# Patient Record
Sex: FEMALE | Race: OTHER | Employment: UNEMPLOYED | URBAN - METROPOLITAN AREA
[De-identification: names, ages, dates, MRNs, and addresses within clinical notes are randomized per-mention and may not be internally consistent; named-entity substitution may affect disease eponyms.]

---

## 2024-08-03 ENCOUNTER — APPOINTMENT (OUTPATIENT)
Dept: GENERAL RADIOLOGY | Age: 89
End: 2024-08-03

## 2024-08-03 ENCOUNTER — APPOINTMENT (OUTPATIENT)
Dept: CT IMAGING | Age: 89
End: 2024-08-03

## 2024-08-03 ENCOUNTER — HOSPITAL ENCOUNTER (EMERGENCY)
Age: 89
Discharge: HOME | End: 2024-08-03
Attending: EMERGENCY MEDICINE

## 2024-08-03 VITALS
DIASTOLIC BLOOD PRESSURE: 84 MMHG | SYSTOLIC BLOOD PRESSURE: 112 MMHG | HEIGHT: 65 IN | RESPIRATION RATE: 18 BRPM | TEMPERATURE: 98.7 F | HEART RATE: 64 BPM | OXYGEN SATURATION: 100 %

## 2024-08-03 DIAGNOSIS — M19.011 ARTHRITIS OF RIGHT SHOULDER REGION: Primary | ICD-10-CM

## 2024-08-03 LAB
ALBUMIN SERPL-MCNC: 3.8 G/DL (ref 3.5–4.6)
ALP SERPL-CCNC: 81 U/L (ref 40–130)
ALT SERPL-CCNC: 12 U/L (ref 0–33)
ANION GAP SERPL CALCULATED.3IONS-SCNC: 7 MEQ/L (ref 9–15)
AST SERPL-CCNC: 14 U/L (ref 0–35)
BASOPHILS # BLD: 0 K/UL (ref 0–0.2)
BASOPHILS NFR BLD: 0.5 %
BILIRUB SERPL-MCNC: 0.5 MG/DL (ref 0.2–0.7)
BUN SERPL-MCNC: 12 MG/DL (ref 8–23)
CALCIUM SERPL-MCNC: 9.7 MG/DL (ref 8.5–9.9)
CHLORIDE SERPL-SCNC: 100 MEQ/L (ref 95–107)
CO2 SERPL-SCNC: 30 MEQ/L (ref 20–31)
CREAT SERPL-MCNC: 0.64 MG/DL (ref 0.5–0.9)
D DIMER PPP FEU-MCNC: 0.75 MG/L FEU (ref 0–0.5)
EKG ATRIAL RATE: 45 BPM
EKG Q-T INTERVAL: 412 MS
EKG QRS DURATION: 92 MS
EKG QTC CALCULATION (BAZETT): 397 MS
EKG R AXIS: -7 DEGREES
EKG T AXIS: 13 DEGREES
EKG VENTRICULAR RATE: 56 BPM
EOSINOPHIL # BLD: 0.2 K/UL (ref 0–0.7)
EOSINOPHIL NFR BLD: 3.3 %
ERYTHROCYTE [DISTWIDTH] IN BLOOD BY AUTOMATED COUNT: 16.8 % (ref 11.5–14.5)
GLOBULIN SER CALC-MCNC: 2.7 G/DL (ref 2.3–3.5)
GLUCOSE SERPL-MCNC: 115 MG/DL (ref 70–99)
HCT VFR BLD AUTO: 36.3 % (ref 37–47)
HGB BLD-MCNC: 10.9 G/DL (ref 12–16)
LYMPHOCYTES # BLD: 1.5 K/UL (ref 1–4.8)
LYMPHOCYTES NFR BLD: 20.1 %
MCH RBC QN AUTO: 25.8 PG (ref 27–31.3)
MCHC RBC AUTO-ENTMCNC: 30 % (ref 33–37)
MCV RBC AUTO: 85.8 FL (ref 79.4–94.8)
MONOCYTES # BLD: 1 K/UL (ref 0.2–0.8)
MONOCYTES NFR BLD: 13 %
NEUTROPHILS # BLD: 4.6 K/UL (ref 1.4–6.5)
NEUTS SEG NFR BLD: 63 %
PLATELET # BLD AUTO: 280 K/UL (ref 130–400)
POTASSIUM SERPL-SCNC: 4.1 MEQ/L (ref 3.4–4.9)
PROT SERPL-MCNC: 6.5 G/DL (ref 6.3–8)
RBC # BLD AUTO: 4.23 M/UL (ref 4.2–5.4)
SODIUM SERPL-SCNC: 137 MEQ/L (ref 135–144)
TROPONIN, HIGH SENSITIVITY: 16 NG/L (ref 0–19)
TROPONIN, HIGH SENSITIVITY: 17 NG/L (ref 0–19)
WBC # BLD AUTO: 7.3 K/UL (ref 4.8–10.8)

## 2024-08-03 PROCEDURE — 71045 X-RAY EXAM CHEST 1 VIEW: CPT

## 2024-08-03 PROCEDURE — 84484 ASSAY OF TROPONIN QUANT: CPT

## 2024-08-03 PROCEDURE — 99285 EMERGENCY DEPT VISIT HI MDM: CPT

## 2024-08-03 PROCEDURE — 85379 FIBRIN DEGRADATION QUANT: CPT

## 2024-08-03 PROCEDURE — 71250 CT THORAX DX C-: CPT

## 2024-08-03 PROCEDURE — 96372 THER/PROPH/DIAG INJ SC/IM: CPT

## 2024-08-03 PROCEDURE — 6360000002 HC RX W HCPCS: Performed by: PHYSICIAN ASSISTANT

## 2024-08-03 PROCEDURE — 73030 X-RAY EXAM OF SHOULDER: CPT

## 2024-08-03 PROCEDURE — 80053 COMPREHEN METABOLIC PANEL: CPT

## 2024-08-03 PROCEDURE — 85025 COMPLETE CBC W/AUTO DIFF WBC: CPT

## 2024-08-03 PROCEDURE — 36415 COLL VENOUS BLD VENIPUNCTURE: CPT

## 2024-08-03 RX ORDER — KETOROLAC TROMETHAMINE 30 MG/ML
30 INJECTION, SOLUTION INTRAMUSCULAR; INTRAVENOUS ONCE
Status: COMPLETED | OUTPATIENT
Start: 2024-08-03 | End: 2024-08-03

## 2024-08-03 RX ADMIN — KETOROLAC TROMETHAMINE 30 MG: 30 INJECTION, SOLUTION INTRAMUSCULAR at 16:19

## 2024-08-03 ASSESSMENT — PAIN - FUNCTIONAL ASSESSMENT: PAIN_FUNCTIONAL_ASSESSMENT: 0-10

## 2024-08-03 ASSESSMENT — ENCOUNTER SYMPTOMS
ABDOMINAL PAIN: 0
ABDOMINAL DISTENTION: 0
EYE DISCHARGE: 0
RHINORRHEA: 0
COLOR CHANGE: 0
SORE THROAT: 0
BACK PAIN: 0
SHORTNESS OF BREATH: 0
CONSTIPATION: 0

## 2024-08-03 ASSESSMENT — PAIN DESCRIPTION - LOCATION
LOCATION: SHOULDER
LOCATION: SHOULDER

## 2024-08-03 ASSESSMENT — LIFESTYLE VARIABLES
HOW MANY STANDARD DRINKS CONTAINING ALCOHOL DO YOU HAVE ON A TYPICAL DAY: PATIENT DOES NOT DRINK
HOW OFTEN DO YOU HAVE A DRINK CONTAINING ALCOHOL: NEVER

## 2024-08-03 ASSESSMENT — PAIN DESCRIPTION - DESCRIPTORS
DESCRIPTORS: ACHING
DESCRIPTORS: ACHING

## 2024-08-03 ASSESSMENT — PAIN DESCRIPTION - PAIN TYPE: TYPE: CHRONIC PAIN

## 2024-08-03 ASSESSMENT — PAIN DESCRIPTION - ORIENTATION
ORIENTATION: RIGHT
ORIENTATION: RIGHT

## 2024-08-03 ASSESSMENT — PAIN SCALES - GENERAL
PAINLEVEL_OUTOF10: 8
PAINLEVEL_OUTOF10: 5

## 2024-08-03 NOTE — ED TRIAGE NOTES
Pt sent in by EMS with c/o right shoulder pain for a few weeks. Pt denies any injury. Pt denies fall. Pt is a/o x 4 anxious. No deformity noted to right shoulder. No acute distress noted

## 2024-08-03 NOTE — ED PROVIDER NOTES
[facility] ED  EMERGENCY DEPARTMENT ENCOUNTER      Pt Name: Paige Dominguez  MRN: [id number]  Birthdate 1935  Date of evaluation: 8/3/2024  Provider: ALEJANDRO Costello  4:04 PM EDT    CHIEF COMPLAINT       Chief Complaint   Patient presents with    Shoulder Pain     Pt c/o right shoulder pain for weeks, denies injury         HISTORY OF PRESENT ILLNESS   (Location/Symptom, Timing/Onset, Context/Setting, Quality, Duration, Modifying Factors, Severity)  Note limiting factors.   Paige Dominguez is a 88 y.o. female who presents to the emergency department with complaint of right shoulder pain, patient arrived by EMS, per EMS states that patient family had stated pain started 2 days ago, when I discussed this with the patient, she states it started this morning for her, she denies any acute injury, no head neck or back pain otherwise, there is no numbness or tingling, no chest pain or shortness of breath, no cough or fevers, she is rating her current pain as a 5 out of 10 at this time, she has not take anything at home for pain control.    HPI    Nursing Notes were reviewed.    REVIEW OF SYSTEMS    (2-9 systems for level 4, 10 or more for level 5)     Review of Systems   Constitutional:  Negative for activity change and appetite change.   HENT:  Negative for congestion, ear discharge, ear pain, nosebleeds, rhinorrhea and sore throat.    Eyes:  Negative for discharge.   Respiratory:  Negative for shortness of breath.    Cardiovascular:  Negative for chest pain, palpitations and leg swelling.   Gastrointestinal:  Negative for abdominal distention, abdominal pain and constipation.   Genitourinary:  Negative for difficulty urinating and dysuria.   Musculoskeletal:  Negative for arthralgias, back pain, neck pain and neck stiffness.        Right shoulder pain   Skin:  Negative for color change.   Neurological:  Negative for dizziness, tremors, syncope, weakness, numbness and headaches.   Psychiatric/Behavioral:           DISCHARGE MEDICATIONS:  New Prescriptions    No medications on file     Controlled Substances Monitoring:          No data to display                (Please note that portions of this note were completed with a voice recognition program.  Efforts were made to edit the dictations but occasionally words are mis-transcribed.)    ALEJANDRO Costello (electronically signed)  Attending Emergency Physician    Supervising Attending Physician: Dr. Almonte.      Hannah Goldstein PA  08/03/24 [id number]

## 2024-08-07 LAB
EKG ATRIAL RATE: 45 BPM
EKG Q-T INTERVAL: 412 MS
EKG QRS DURATION: 92 MS
EKG QTC CALCULATION (BAZETT): 397 MS
EKG R AXIS: -7 DEGREES
EKG T AXIS: 13 DEGREES
EKG VENTRICULAR RATE: 56 BPM

## 2024-08-25 ENCOUNTER — HOSPITAL ENCOUNTER (EMERGENCY)
Age: 89
Discharge: HOME | End: 2024-08-25
Attending: EMERGENCY MEDICINE
Payer: MEDICARE

## 2024-08-25 ENCOUNTER — APPOINTMENT (OUTPATIENT)
Dept: GENERAL RADIOLOGY | Age: 89
End: 2024-08-25
Payer: MEDICARE

## 2024-08-25 VITALS
BODY MASS INDEX: 29.95 KG/M2 | WEIGHT: 180 LBS | TEMPERATURE: 98.1 F | SYSTOLIC BLOOD PRESSURE: 132 MMHG | DIASTOLIC BLOOD PRESSURE: 63 MMHG | OXYGEN SATURATION: 100 % | HEART RATE: 64 BPM | RESPIRATION RATE: 20 BRPM

## 2024-08-25 DIAGNOSIS — F03.B0 MODERATE DEMENTIA, UNSPECIFIED DEMENTIA TYPE, UNSPECIFIED WHETHER BEHAVIORAL, PSYCHOTIC, OR MOOD DISTURBANCE OR ANXIETY (HCC): ICD-10-CM

## 2024-08-25 DIAGNOSIS — Z13.9 ENCOUNTER FOR MEDICAL SCREENING EXAMINATION: Primary | ICD-10-CM

## 2024-08-25 LAB
ALBUMIN SERPL-MCNC: 3.8 G/DL (ref 3.5–4.6)
ALP SERPL-CCNC: 81 U/L (ref 40–130)
ALT SERPL-CCNC: 15 U/L (ref 0–33)
ANION GAP SERPL CALCULATED.3IONS-SCNC: 7 MEQ/L (ref 9–15)
AST SERPL-CCNC: 16 U/L (ref 0–35)
B PARAP IS1001 DNA NPH QL NAA+NON-PROBE: NOT DETECTED
B PERT.PT PRMT NPH QL NAA+NON-PROBE: NOT DETECTED
BACTERIA URNS QL MICRO: NEGATIVE /HPF
BASOPHILS # BLD: 0 K/UL (ref 0–0.2)
BASOPHILS NFR BLD: 0.5 %
BILIRUB SERPL-MCNC: 0.5 MG/DL (ref 0.2–0.7)
BILIRUB UR QL STRIP: NEGATIVE
BUN SERPL-MCNC: 12 MG/DL (ref 8–23)
C PNEUM DNA NPH QL NAA+NON-PROBE: NOT DETECTED
CALCIUM SERPL-MCNC: 9.4 MG/DL (ref 8.5–9.9)
CHLORIDE SERPL-SCNC: 104 MEQ/L (ref 95–107)
CLARITY UR: CLEAR
CO2 SERPL-SCNC: 30 MEQ/L (ref 20–31)
COLOR UR: YELLOW
CREAT SERPL-MCNC: 0.59 MG/DL (ref 0.5–0.9)
EOSINOPHIL # BLD: 0.2 K/UL (ref 0–0.7)
EOSINOPHIL NFR BLD: 2.5 %
EPI CELLS #/AREA URNS AUTO: NORMAL /HPF (ref 0–5)
ERYTHROCYTE [DISTWIDTH] IN BLOOD BY AUTOMATED COUNT: 17.3 % (ref 11.5–14.5)
FLUAV RNA NPH QL NAA+NON-PROBE: NOT DETECTED
FLUBV RNA NPH QL NAA+NON-PROBE: NOT DETECTED
GLOBULIN SER CALC-MCNC: 2.7 G/DL (ref 2.3–3.5)
GLUCOSE SERPL-MCNC: 216 MG/DL (ref 70–99)
GLUCOSE UR STRIP-MCNC: NEGATIVE MG/DL
HADV DNA NPH QL NAA+NON-PROBE: NOT DETECTED
HCOV 229E RNA NPH QL NAA+NON-PROBE: NOT DETECTED
HCOV HKU1 RNA NPH QL NAA+NON-PROBE: NOT DETECTED
HCOV NL63 RNA NPH QL NAA+NON-PROBE: NOT DETECTED
HCOV OC43 RNA NPH QL NAA+NON-PROBE: NOT DETECTED
HCT VFR BLD AUTO: 36.4 % (ref 37–47)
HGB BLD-MCNC: 11.2 G/DL (ref 12–16)
HGB UR QL STRIP: NEGATIVE
HMPV RNA NPH QL NAA+NON-PROBE: NOT DETECTED
HPIV1 RNA NPH QL NAA+NON-PROBE: NOT DETECTED
HPIV2 RNA NPH QL NAA+NON-PROBE: NOT DETECTED
HPIV3 RNA NPH QL NAA+NON-PROBE: NOT DETECTED
HPIV4 RNA NPH QL NAA+NON-PROBE: NOT DETECTED
HYALINE CASTS #/AREA URNS AUTO: NORMAL /HPF (ref 0–5)
KETONES UR STRIP-MCNC: NEGATIVE MG/DL
LACTATE BLDV-SCNC: 1.7 MMOL/L (ref 0.5–2.2)
LEUKOCYTE ESTERASE UR QL STRIP: NEGATIVE
LYMPHOCYTES # BLD: 1.4 K/UL (ref 1–4.8)
LYMPHOCYTES NFR BLD: 23 %
M PNEUMO DNA NPH QL NAA+NON-PROBE: NOT DETECTED
MCH RBC QN AUTO: 25.8 PG (ref 27–31.3)
MCHC RBC AUTO-ENTMCNC: 30.8 % (ref 33–37)
MCV RBC AUTO: 83.9 FL (ref 79.4–94.8)
MONOCYTES # BLD: 0.9 K/UL (ref 0.2–0.8)
MONOCYTES NFR BLD: 14.6 %
NEUTROPHILS # BLD: 3.6 K/UL (ref 1.4–6.5)
NEUTS SEG NFR BLD: 59.2 %
NITRITE UR QL STRIP: NEGATIVE
PH UR STRIP: 6.5 [PH] (ref 5–9)
PLATELET # BLD AUTO: 324 K/UL (ref 130–400)
POTASSIUM SERPL-SCNC: 4.2 MEQ/L (ref 3.4–4.9)
PROT SERPL-MCNC: 6.5 G/DL (ref 6.3–8)
PROT UR STRIP-MCNC: 100 MG/DL
RBC # BLD AUTO: 4.34 M/UL (ref 4.2–5.4)
RBC #/AREA URNS AUTO: NORMAL /HPF (ref 0–5)
RSV RNA NPH QL NAA+NON-PROBE: NOT DETECTED
RV+EV RNA NPH QL NAA+NON-PROBE: NOT DETECTED
SARS-COV-2 RNA NPH QL NAA+NON-PROBE: NOT DETECTED
SODIUM SERPL-SCNC: 141 MEQ/L (ref 135–144)
SP GR UR STRIP: 1.01 (ref 1–1.03)
URINE REFLEX TO CULTURE: ABNORMAL
UROBILINOGEN UR STRIP-ACNC: 1 E.U./DL
WBC # BLD AUTO: 6.1 K/UL (ref 4.8–10.8)
WBC #/AREA URNS AUTO: NORMAL /HPF (ref 0–5)

## 2024-08-25 PROCEDURE — 85025 COMPLETE CBC W/AUTO DIFF WBC: CPT

## 2024-08-25 PROCEDURE — 81001 URINALYSIS AUTO W/SCOPE: CPT

## 2024-08-25 PROCEDURE — 99284 EMERGENCY DEPT VISIT MOD MDM: CPT

## 2024-08-25 PROCEDURE — 83605 ASSAY OF LACTIC ACID: CPT

## 2024-08-25 PROCEDURE — 6370000000 HC RX 637 (ALT 250 FOR IP): Performed by: EMERGENCY MEDICINE

## 2024-08-25 PROCEDURE — 71045 X-RAY EXAM CHEST 1 VIEW: CPT

## 2024-08-25 PROCEDURE — 0202U NFCT DS 22 TRGT SARS-COV-2: CPT

## 2024-08-25 PROCEDURE — 94640 AIRWAY INHALATION TREATMENT: CPT

## 2024-08-25 PROCEDURE — 94761 N-INVAS EAR/PLS OXIMETRY MLT: CPT

## 2024-08-25 PROCEDURE — 80053 COMPREHEN METABOLIC PANEL: CPT

## 2024-08-25 RX ORDER — IPRATROPIUM BROMIDE AND ALBUTEROL SULFATE 2.5; .5 MG/3ML; MG/3ML
1 SOLUTION RESPIRATORY (INHALATION) CONTINUOUS PRN
Status: DISCONTINUED | OUTPATIENT
Start: 2024-08-25 | End: 2024-08-26 | Stop reason: HOSPADM

## 2024-08-25 RX ADMIN — IPRATROPIUM BROMIDE AND ALBUTEROL SULFATE 1 DOSE: .5; 2.5 SOLUTION RESPIRATORY (INHALATION) at 20:06

## 2024-08-25 ASSESSMENT — PAIN - FUNCTIONAL ASSESSMENT: PAIN_FUNCTIONAL_ASSESSMENT: 0-10

## 2024-08-25 ASSESSMENT — PAIN SCALES - GENERAL: PAINLEVEL_OUTOF10: 0

## 2024-08-25 NOTE — ED TRIAGE NOTES
Pt here for black tarry stools starting yesterday. Per EMS, patient family states pt is more confused. Patient is A&Ox2 at baseline. Pt is A&Ox2 on arrival. Pt on eliquis. Pt VSS, but has some wheezing. Patient denies pain and SOB.

## 2024-08-26 NOTE — ED NOTES
Provider notified that patient had pepto x 1 last night, but was already having bloody stools prior. Pt reports 7 episodes of tarry stool.

## 2024-08-26 NOTE — ED PROVIDER NOTES
[facility] ED  eMERGENCY dEPARTMENT eNCOUnter      Pt Name: Paige Dominguez  MRN: [id number]  Birthdate 1935  Date of evaluation: 8/25/2024  Provider: Bob Shaw MD    CHIEF COMPLAINT       Chief Complaint   Patient presents with    Melena     Black tarry stools since yesterday. Pt on eliquis.     Altered Mental Status     Per EMS, pt is more confused than baseline of A&Ox 2.          HISTORY OF PRESENT ILLNESS   (Location/Symptom, Timing/Onset,Context/Setting, Quality, Duration, Modifying Factors, Severity)  Note limiting factors.   Paige Dominguez is a 88 y.o. female who presents to the emergency department with complaints of black stool and altered mental status.  Patient has history of dementia and is wheelchair bound lives at home with family.  They noted black stool last couple days but she also received Pepto-Bismol at home.  They felt that today she was less alert than normal.  Called EMS to have her brought in for evaluation.  The patient is awake alert and oriented x 2 which is her baseline.  She appears to have some increased work of breathing and audible wheezes on presentation.  She has no complaints of pain.  No fever.  No cough.    HPI    NursingNotes were reviewed.    REVIEW OF SYSTEMS    (2-9 systems for level 4, 10 or more for level 5)     Review of Systems   Unable to perform ROS: Dementia       Except as noted above the remainder of the review of systems was reviewed and negative.       PAST MEDICAL HISTORY   No past medical history on file.      SURGICALHISTORY     No past surgical history on file.      CURRENT MEDICATIONS       Previous Medications    No medications on file       ALLERGIES     Penicillins    FAMILY HISTORY     No family history on file.       SOCIAL HISTORY       Social History     Socioeconomic History    Marital status: Single     Social Determinants of Health     Financial Resource Strain: Patient Declined (6/5/2023)    Received from [facility], 
[phone]

## 2024-08-26 NOTE — ED NOTES
Rectal exam performed by Dr. Shaw with this nurse present. Patient occult stool negative. Control WNL.

## 2024-11-21 ENCOUNTER — HOSPITAL ENCOUNTER (INPATIENT)
Age: 89
LOS: 3 days | Discharge: HOME HEALTH | DRG: 884 | End: 2024-11-24
Attending: FAMILY MEDICINE | Admitting: INTERNAL MEDICINE
Payer: MEDICARE

## 2024-11-21 ENCOUNTER — APPOINTMENT (OUTPATIENT)
Dept: CT IMAGING | Age: 89
DRG: 884 | End: 2024-11-21
Payer: MEDICARE

## 2024-11-21 ENCOUNTER — APPOINTMENT (OUTPATIENT)
Dept: GENERAL RADIOLOGY | Age: 89
DRG: 884 | End: 2024-11-21
Payer: MEDICARE

## 2024-11-21 PROBLEM — G93.40 ENCEPHALOPATHY ACUTE: Status: ACTIVE | Noted: 2024-11-21

## 2024-11-21 ASSESSMENT — PAIN - FUNCTIONAL ASSESSMENT: PAIN_FUNCTIONAL_ASSESSMENT: NONE - DENIES PAIN

## 2024-11-21 NOTE — PLAN OF CARE
Problem: Discharge Planning  Goal: Discharge to home or other facility with appropriate resources  Outcome: Progressing     Problem: Skin/Tissue Integrity  Goal: Absence of new skin breakdown  Description: 1.  Monitor for areas of redness and/or skin breakdown  2.  Assess vascular access sites hourly  3.  Every 4-6 hours minimum:  Change oxygen saturation probe site  4.  Every 4-6 hours:  If on nasal continuous positive airway pressure, respiratory therapy assess nares and determine need for appliance change or resting period.  Outcome: Progressing     Problem: Confusion  Goal: Confusion, delirium, dementia, or psychosis is improved or at baseline  Description: INTERVENTIONS:  1. Assess for possible contributors to thought disturbance, including medications, impaired vision or hearing, underlying metabolic abnormalities, dehydration, psychiatric diagnoses, and notify attending LIP  2. [place] high risk fall precautions, as indicated  3. Provide frequent short contacts to provide reality reorientation, refocusing and direction  4. Decrease environmental stimuli, including noise as appropriate  5. Monitor and intervene to maintain adequate nutrition, hydration, elimination, sleep and activity  6. If unable to ensure safety without constant attention obtain sitter and review sitter guidelines with assigned personnel  7. Initiate Psychosocial CNS and Spiritual Care consult, as indicated  Outcome: Progressing     Problem: Safety - Adult  Goal: Free from fall injury  Outcome: Progressing

## 2024-11-21 NOTE — H&P
Hospital Medicine  History and Physical    Patient:  Paige Dominguez  MRN: [id number]    CHIEF COMPLAINT:    Chief Complaint   Patient presents with    Dementia       History Obtained From:  Patient, EMR  Primary Care Physician: Unknown, Provider, MD    HISTORY OF PRESENT ILLNESS:   The patient is a 89 y.o. female with PMH of HTN, AF, DM2, CKD3, asthma, GERD, depression,right hip fracture s/p repair, RUL NSCLC s/p RT, dementia who presented with the above CC. Patient has been experiencing worsening of her mental status at home including hallucinations and smearing poop around the house per report. She was hypertensive on arrival with SBP in the 180-190s, initial w/u showed possible RUL pneumonia, elevated lactate and hyperglycemia, IVFs and Rocephin/Zithromax given, admitted for further management.     Past Medical History:  HTN, AF, DM2, CKD3, asthma, GERD, depression,right hip fracture s/p repair, RUL NSCLC s/p RT, dementia     Past Surgical History:  No past surgical history on file.    Medications Prior to Admission:    Prior to Admission medications    Not on File       Allergies:  Penicillins    Social History:   TOBACCO:   has no history on file for tobacco use.  ETOH:   has no history on file for alcohol use.      Family History:   No family history on file.    REVIEW OF SYSTEMS:  Ten systems reviewed and negative except for stated in HPI    Physical Exam:    Vitals: BP (!) 193/79   Pulse 78   Temp 98.9 °F (37.2 °C) (Oral)   Resp 26   Ht 1.651 m (5' 5\")   Wt 79.4 kg (175 lb)   SpO2 97%   BMI 29.12 kg/m²   General appearance: awake, cooperative  HEENT: Head: Normocephalic, no lesions, without obvious abnormality.  Neck: supple, symmetrical, trachea midline  Lungs: clear to auscultation bilaterally  Heart: S1/S2, irregular  Abdomen: soft, active BS  Extremities:  no edema  Neurologic: Mental status: AxOx1(self), follows commands     Recent Labs     11/21/24  1442   WBC 5.7   HGB 12.9        Recent Labs

## 2024-11-21 NOTE — ED TRIAGE NOTES
[facility] reports pt was showing bazarre behavior, hallucinating, smearing poop in places. Pt has hx of dementia but appears to be getting worse. Pt on arrival able follow commands denies pain, oriented to self

## 2024-11-21 NOTE — ED PROVIDER NOTES
[facility] ED  EMERGENCY DEPARTMENT ENCOUNTER      Pt Name: Paige Dominguez  MRN: [id number]  Birthdate 1935  Date of evaluation: 11/21/2024  Provider: Graeme Babin PA-C  2:18 PM EST      CHIEF COMPLAINT       Chief Complaint   Patient presents with    Dementia         HISTORY OF PRESENT ILLNESS   (Location/Symptom, Timing/Onset, Context/Setting, Quality, Duration, Modifying Factors, Severity)  Note limiting factors.   Paige Dominguez is a 89 y.o. female who presents to the emergency department with PMHx dementia who presents to the emergency department with family states rapid decline in patient's mentation since yesterday, with sleep disturbances, delusions, hallucinations and rapid decline in functioning.  Patient is alert to self only and unable to provide insight into the nature of her illness.    HPI    Nursing Notes were reviewed.    REVIEW OF SYSTEMS    (2-9 systems for level 4, 10 or more for level 5)     Review of Systems   Unable to perform ROS: Dementia       Except as noted above the remainder of the review of systems was reviewed and negative.       PAST MEDICAL HISTORY   No past medical history on file.      SURGICAL HISTORY     No past surgical history on file.      CURRENT MEDICATIONS       Previous Medications    No medications on file       ALLERGIES     Penicillins    FAMILY HISTORY     No family history on file.       SOCIAL HISTORY       Social History     Socioeconomic History    Marital status: Single     Social Determinants of Health     Financial Resource Strain: Patient Declined (6/5/2023)    Received from [facility]    Overall Financial Resource Strain (CARDIA)     Difficulty of Paying Living Expenses: Patient declined   Food Insecurity: Patient Declined (6/5/2023)    Received from [facility]    Hunger Vital Sign     Worried About Running Out of Food in the Last Year: Patient declined     Ran Out of Food in the Last Year: Patient

## 2024-11-22 ENCOUNTER — APPOINTMENT (OUTPATIENT)
Age: 89
DRG: 884 | End: 2024-11-22
Attending: INTERNAL MEDICINE
Payer: MEDICARE

## 2024-11-22 PROBLEM — J18.9 PNEUMONIA OF RIGHT UPPER LOBE DUE TO INFECTIOUS ORGANISM: Status: ACTIVE | Noted: 2024-11-22

## 2024-11-22 NOTE — CONSULTS
Inpatient consult to Cardiology  Consult performed by: Sravan Marino MD  Consult ordered by: Angelita Garner APRN - CNP          Patient Name: Paige Dominguez  Admit Date: 2024  1:54 PM  MR #: [id number]  : 1935    Attending Physician: Jorge Anguiano MD  Reason for consult: Bradycardia    History of Presenting Illness:      Paige Dominguez is a 89 y.o. female on hospital day 1 with a history of hypertension, hyperlipidemia, diabetes, CKD dementia admitted to the hospital for altered mental status. History Obtained From:  patient, electronic medical record      EKG atrial fibrillation rate 73 bpm nonspecific ST changes high lateral leads  On telemetry patient noted with sinus pauses of 2.6 seconds  Electrolytes grossly unremarkable    History:      History reviewed. No pertinent past medical history.  No past surgical history on file.  Family History  No family history on file.  [] Unable to obtain due to ventilated and/ or neurologic status  Social History     Socioeconomic History    Marital status: Single     Spouse name: Not on file    Number of children: Not on file    Years of education: Not on file    Highest education level: Not on file   Occupational History    Not on file   Tobacco Use    Smoking status: Never    Smokeless tobacco: Never   Vaping Use    Vaping status: Never Used   Substance and Sexual Activity    Alcohol use: Not on file    Drug use: Not on file    Sexual activity: Not on file   Other Topics Concern    Not on file   Social History Narrative    Not on file     Social Determinants of Health     Financial Resource Strain: Patient Declined (2023)    Received from [facility], [facility]    Overall Financial Resource Strain (CARDIA)     Difficulty of Paying Living Expenses: Patient declined   Food Insecurity: No Food Insecurity (2024)    Hunger Vital Sign     Worried About Running Out of Food in the Last Year: Never true     Ran Out of Food in the Last Year: Never 
American Board of Psychiatry & Neurology  Board Certified in Vascular Neurology  Board Certified in Neuromuscular Medicine  Certified in Neurorehabilitation           Collaborating physicians: Dr Baumann    Electronically signed by YANETH Zarco CNP on 11/22/2024 at 10:40 AM

## 2024-11-22 NOTE — CARE COORDINATION
SPOKE W/SON SENATE TO ASSESS NEEDS AND DISCUSS DISCHARGE PLAN WHICH IS HOME W/FAMILY WHO ARE SUPPORTIVE.  THEY DECLINE SNF/REHAB. THEY ARE AGREEABLE TO [facility] IF INDICATED. THEY WILL CALL CM BACK WITH THE NAME OF THE [facility] AGENCY TO SEND REFERRAL. CURRENTLY PT IS ON THE PHONE. BILLS IN ROOM FOR SAFETY. REVIEWED PG 1 IMM AND WILL PROVIDE COPY TO PT. SON ACKNOWLEDGES UNDERSTANDING. NO QUESTIONS AT THIS TIME.

## 2024-11-22 NOTE — CARE COORDINATION
Met with patient. Patient states she lives at home and her family helps take care of her. Takes to appointments and gets medications. Patient denies any concerns with getting her medications. Patient provided Pneumonia Education Booklet and Zone as she prefers [place] literature. English copy provided for daughter in law that speaks English as she is her care giver.   Definition of pneumonia discussed.  Causes of different types of pneumonia reviewed.  Symptoms discussed and pt does understand that they may have some or all of these symptoms.  Testing to diagnose pneumonia reviewed as well as possible treatments.  Importance of avoiding infections discussed including: taking medication as directed, washing hands, disposing of used tissues, getting the pneumonia and flu vaccines, and avoiding others who are ill.  Importance of not smoking and to avoid others who may be smoking around patient stressed.  Pneumonia Zones also reviewed. \"Green\" zone is the goal, \"Yellow\" zone means to call the doctor, and \"Red\" zone means to call the doctor ASAP or call 911.  Copies of Pneumonia booklet and Zone Pamphlet given to patient.  Offer for patient to express any concerns or questions. Pt denies having further questions at this time.

## 2024-11-22 NOTE — PLAN OF CARE
Problem: Discharge Planning  Goal: Discharge to home or other facility with appropriate resources  11/22/2024 0842 by Yaima Saucedo RN  Outcome: Progressing  11/22/2024 0223 by Mari Arroyo RN  Outcome: Progressing     Problem: Skin/Tissue Integrity  Goal: Absence of new skin breakdown  Description: 1.  Monitor for areas of redness and/or skin breakdown  2.  Assess vascular access sites hourly  3.  Every 4-6 hours minimum:  Change oxygen saturation probe site  4.  Every 4-6 hours:  If on nasal continuous positive airway pressure, respiratory therapy assess nares and determine need for appliance change or resting period.  11/22/2024 0842 by Yaima Saucedo RN  Outcome: Progressing  11/22/2024 0223 by Mari Arroyo RN  Outcome: Progressing     Problem: Confusion  Goal: Confusion, delirium, dementia, or psychosis is improved or at baseline  Description: INTERVENTIONS:  1. Assess for possible contributors to thought disturbance, including medications, impaired vision or hearing, underlying metabolic abnormalities, dehydration, psychiatric diagnoses, and notify attending LIP  2. [place] high risk fall precautions, as indicated  3. Provide frequent short contacts to provide reality reorientation, refocusing and direction  4. Decrease environmental stimuli, including noise as appropriate  5. Monitor and intervene to maintain adequate nutrition, hydration, elimination, sleep and activity  6. If unable to ensure safety without constant attention obtain sitter and review sitter guidelines with assigned personnel  7. Initiate Psychosocial CNS and Spiritual Care consult, as indicated  11/22/2024 0842 by Yaima Saucedo RN  Outcome: Progressing  11/22/2024 0223 by Mari Arroyo RN  Outcome: Progressing     Problem: Safety - Adult  Goal: Free from fall injury  11/22/2024 0842 by Yaima Sacuedo RN  Outcome: Progressing  11/22/2024 0223 by Mari Arroyo RN  Outcome: Progressing

## 2024-11-22 NOTE — CARE COORDINATION
Case Management Assessment  Initial Evaluation    Date/Time of Evaluation: 11/21/2024 9:33 PM  Assessment Completed by: Charlotte Christian RN    If patient is discharged prior to next notation, then this note serves as note for discharge by case management.    Patient Name: Paige Dominguez                   YOB: 1935  Diagnosis: Delirium [R41.0]  Encephalopathy acute [G93.40]  Pneumonia of right upper lobe due to infectious organism [J18.9]                   Date / Time: 11/21/2024  1:54 PM    Patient Admission Status: Inpatient   Readmission Risk (Low < 19, Mod (19-27), High > 27): Readmission Risk Score: 11.5    Current PCP: Unknown, Provider, MD  PCP verified by CM? (P) Yes    Chart Reviewed: Yes      History Provided by: (P) Child/Family  Patient Orientation: (P) Alert and Oriented, Person    Patient Cognition: (P) Dementia / Early Alzheimer's    Hospitalization in the last 30 days (Readmission):  No    If yes, Readmission Assessment in  Navigator will be completed.    Advance Directives:      Code Status: Full Code   Patient's Primary Decision Maker is: (P) Legal Next of Kin (son Senate)      Discharge Planning:    Patient lives with: (P) Children, Family Members Type of Home: (P) House  Primary Care Giver: (P) Family  Patient Support Systems include: (P) Children, Family Members   Current Financial resources: (P) Medicare, Medicaid  Current community resources: (P) Other (Comment) (palliative care/visiting Dr through [facility].)  Current services prior to admission: (P) Durable Medical Equipment            Current DME: (P) Home Aerosol, Bedside Commode, Walker, Shower Chair, Hospital Bed            Type of Home Care services:  (P)  (she has [facility] palliative care.)    ADLS  Prior functional level: (P) Assistance with the following:, Bathing, Dressing, Cooking, Housework, Shopping, Mobility, Toileting  Current functional level: (P) Assistance with the following:, Bathing, Dressing, Toileting, Cooking,

## 2024-11-22 NOTE — ACP (ADVANCE CARE PLANNING)
Advance Care Planning     Advance Care Planning Activator (Inpatient)  Conversation Note      Date of ACP Conversation: 11/21/2024     Conversation Conducted with: Legal next of kin    ACP Activator: Charlotte Christian RN        Health Care Decision Maker:     Current Designated Health Care Decision Maker:     Primary Decision Maker: Ramona Mason - Child - [phone]    Secondary Decision Maker: Rody Mason - Daughter-in-Law - [phone]

## 2024-11-22 NOTE — PLAN OF CARE
Problem: Discharge Planning  Goal: Discharge to home or other facility with appropriate resources  11/22/2024 0223 by Mari Arroyo RN  Outcome: Progressing  11/21/2024 1736 by Yaima Saucedo RN  Outcome: Progressing     Problem: Skin/Tissue Integrity  Goal: Absence of new skin breakdown  Description: 1.  Monitor for areas of redness and/or skin breakdown  2.  Assess vascular access sites hourly  3.  Every 4-6 hours minimum:  Change oxygen saturation probe site  4.  Every 4-6 hours:  If on nasal continuous positive airway pressure, respiratory therapy assess nares and determine need for appliance change or resting period.  11/22/2024 0223 by Mari Arroyo RN  Outcome: Progressing  11/21/2024 1736 by Yaima Saucedo RN  Outcome: Progressing     Problem: Confusion  Goal: Confusion, delirium, dementia, or psychosis is improved or at baseline  Description: INTERVENTIONS:  1. Assess for possible contributors to thought disturbance, including medications, impaired vision or hearing, underlying metabolic abnormalities, dehydration, psychiatric diagnoses, and notify attending LIP  2. [place] high risk fall precautions, as indicated  3. Provide frequent short contacts to provide reality reorientation, refocusing and direction  4. Decrease environmental stimuli, including noise as appropriate  5. Monitor and intervene to maintain adequate nutrition, hydration, elimination, sleep and activity  6. If unable to ensure safety without constant attention obtain sitter and review sitter guidelines with assigned personnel  7. Initiate Psychosocial CNS and Spiritual Care consult, as indicated  11/22/2024 0223 by Mari Arroyo, RN  Outcome: Progressing  11/21/2024 1736 by Yaima Saucedo RN  Outcome: Progressing     Problem: Safety - Adult  Goal: Free from fall injury  11/22/2024 0223 by Mari Arroyo, RN  Outcome: Progressing  11/21/2024 1736 by Yaima Saucedo RN  Outcome: Progressing

## 2024-11-23 ASSESSMENT — PAIN SCALES - GENERAL
PAINLEVEL_OUTOF10: 0
PAINLEVEL_OUTOF10: 2
PAINLEVEL_OUTOF10: 4

## 2024-11-23 ASSESSMENT — PAIN DESCRIPTION - LOCATION
LOCATION: NECK
LOCATION: NECK

## 2024-11-23 NOTE — PLAN OF CARE
Problem: Discharge Planning  Goal: Discharge to home or other facility with appropriate resources  11/23/2024 1003 by Rachel Enrique RN  Outcome: Progressing  11/22/2024 2206 by Mari Arroyo RN  Outcome: Progressing     Problem: Skin/Tissue Integrity  Goal: Absence of new skin breakdown  Description: 1.  Monitor for areas of redness and/or skin breakdown  2.  Assess vascular access sites hourly  3.  Every 4-6 hours minimum:  Change oxygen saturation probe site  4.  Every 4-6 hours:  If on nasal continuous positive airway pressure, respiratory therapy assess nares and determine need for appliance change or resting period.  11/23/2024 1003 by Rachel Enrique RN  Outcome: Progressing  11/22/2024 2206 by Mari Arroyo RN  Outcome: Progressing     Problem: Confusion  Goal: Confusion, delirium, dementia, or psychosis is improved or at baseline  Description: INTERVENTIONS:  1. Assess for possible contributors to thought disturbance, including medications, impaired vision or hearing, underlying metabolic abnormalities, dehydration, psychiatric diagnoses, and notify attending LIP  2. [place] high risk fall precautions, as indicated  3. Provide frequent short contacts to provide reality reorientation, refocusing and direction  4. Decrease environmental stimuli, including noise as appropriate  5. Monitor and intervene to maintain adequate nutrition, hydration, elimination, sleep and activity  6. If unable to ensure safety without constant attention obtain sitter and review sitter guidelines with assigned personnel  7. Initiate Psychosocial CNS and Spiritual Care consult, as indicated  11/23/2024 1003 by Rachel Enrique RN  Outcome: Progressing  11/22/2024 2206 by Mari Arroyo RN  Outcome: Progressing     Problem: Safety - Adult  Goal: Free from fall injury  11/23/2024 1003 by Rachel Enrique RN  Outcome: Progressing  11/22/2024 2206 by Mari Arroyo RN  Outcome: Progressing     Problem: Pain  Goal:

## 2024-11-23 NOTE — PLAN OF CARE
Problem: Discharge Planning  Goal: Discharge to home or other facility with appropriate resources  11/22/2024 2206 by Mari Arroyo RN  Outcome: Progressing  11/22/2024 0842 by Yaima Saucedo RN  Outcome: Progressing     Problem: Skin/Tissue Integrity  Goal: Absence of new skin breakdown  Description: 1.  Monitor for areas of redness and/or skin breakdown  2.  Assess vascular access sites hourly  3.  Every 4-6 hours minimum:  Change oxygen saturation probe site  4.  Every 4-6 hours:  If on nasal continuous positive airway pressure, respiratory therapy assess nares and determine need for appliance change or resting period.  11/22/2024 2206 by Mari Arroyo RN  Outcome: Progressing  11/22/2024 0842 by Yaima Saucedo RN  Outcome: Progressing     Problem: Confusion  Goal: Confusion, delirium, dementia, or psychosis is improved or at baseline  Description: INTERVENTIONS:  1. Assess for possible contributors to thought disturbance, including medications, impaired vision or hearing, underlying metabolic abnormalities, dehydration, psychiatric diagnoses, and notify attending LIP  2. [place] high risk fall precautions, as indicated  3. Provide frequent short contacts to provide reality reorientation, refocusing and direction  4. Decrease environmental stimuli, including noise as appropriate  5. Monitor and intervene to maintain adequate nutrition, hydration, elimination, sleep and activity  6. If unable to ensure safety without constant attention obtain sitter and review sitter guidelines with assigned personnel  7. Initiate Psychosocial CNS and Spiritual Care consult, as indicated  11/22/2024 2206 by Mari Arroyo, RN  Outcome: Progressing  11/22/2024 0842 by Yaima Saucedo RN  Outcome: Progressing     Problem: Safety - Adult  Goal: Free from fall injury  11/22/2024 2206 by Mari Arroyo, RN  Outcome: Progressing  11/22/2024 0842 by Yaima Saucedo RN  Outcome: Progressing

## 2024-11-24 ASSESSMENT — PAIN SCALES - GENERAL: PAINLEVEL_OUTOF10: 0

## 2024-11-24 NOTE — DISCHARGE INSTRUCTIONS
** I STRONGLY RECOMMEND that you follow up with Mariia Mosley MD within 3 to 5 days for a post hospitalization evaluation. This specific office visit is covered by your insurance, and is not the same as your annual doctor visit/ check up. This office visit is important, as it may prevent need for repeat and/or future hospitalizations.**

## 2024-11-24 NOTE — DISCHARGE SUMMARY
Hospital Medicine Discharge Summary    Paige Dominguez  :  1935  MRN:  [id number]    Admit date:  2024  Discharge date:  2024    Admitting Physician:  Jorge Anguiano MD  Primary Care Physician:  Mariia Mosley MD      Discharge Diagnoses:    Principal Problem:    Encephalopathy acute  Active Problems:    Pneumonia of right upper lobe due to infectious organism  Resolved Problems:    * No resolved hospital problems. *      Hospital Course:   Paige Dominguez is a 89 y.o. female that was admitted and treated at [facility] for the following medical issues:     Acute encephalopathy  - due to worsening of dementia   - CXR showed persistent RUL infiltrate likely related to known lung cancer and radiation changes   - CT head, U/a, respiratory panel were negative  - stopped IV antibiotics  - started on Namenda  - improving clinically   - followed by neurology     Hypertensive urgency  - with SBP in the 180-190s on arrival  - resumed home meds     AFIB with intermittent bradycardia / sinus pauses  - TTE showed preserved LVEF, DD, mod MR/TR   - no indication for pacemaker per cardiology  - continued Lopressor and Apixaban         Disposition - home with [facility] today, family declined SNF/rehab          Patient was seen by the following consultants while admitted to [facility]:   Consults:  IP CONSULT TO NEUROLOGY  IP CONSULT TO CARDIOLOGY  IP CONSULT TO HOME CARE NEEDS    Significant Diagnostic Studies:    Echo (TTE) complete (PRN contrast/bubble/strain/3D)    Result Date: 2024    Left Ventricle: Normal left ventricular systolic function with a visually estimated EF of 55 - 60%. Left ventricle size is normal. Mildly increased wall thickness. Normal wall motion. Diastolic dysfunction present with normal LV EF.   Right Ventricle: Normal systolic function.   Mitral Valve: Moderate regurgitation.   Tricuspid Valve: Moderate regurgitation.   Left Atrium: Left atrium is

## 2024-11-24 NOTE — PLAN OF CARE
Problem: Discharge Planning  Goal: Discharge to home or other facility with appropriate resources  11/24/2024 0951 by Rachel Enrique RN  Outcome: Progressing  11/24/2024 0014 by Roro Eubanks RN  Outcome: Progressing     Problem: Skin/Tissue Integrity  Goal: Absence of new skin breakdown  Description: 1.  Monitor for areas of redness and/or skin breakdown  2.  Assess vascular access sites hourly  3.  Every 4-6 hours minimum:  Change oxygen saturation probe site  4.  Every 4-6 hours:  If on nasal continuous positive airway pressure, respiratory therapy assess nares and determine need for appliance change or resting period.  11/24/2024 0951 by Rachel Enrique RN  Outcome: Progressing  11/24/2024 0014 by Roro Eubanks RN  Outcome: Progressing     Problem: Confusion  Goal: Confusion, delirium, dementia, or psychosis is improved or at baseline  Description: INTERVENTIONS:  1. Assess for possible contributors to thought disturbance, including medications, impaired vision or hearing, underlying metabolic abnormalities, dehydration, psychiatric diagnoses, and notify attending LIP  2. [place] high risk fall precautions, as indicated  3. Provide frequent short contacts to provide reality reorientation, refocusing and direction  4. Decrease environmental stimuli, including noise as appropriate  5. Monitor and intervene to maintain adequate nutrition, hydration, elimination, sleep and activity  6. If unable to ensure safety without constant attention obtain sitter and review sitter guidelines with assigned personnel  7. Initiate Psychosocial CNS and Spiritual Care consult, as indicated  11/24/2024 0951 by Rachel Enrique RN  Outcome: Progressing  11/24/2024 0014 by Roro Eubanks RN  Outcome: Progressing     Problem: Safety - Adult  Goal: Free from fall injury  11/24/2024 0951 by Rachel Enrique RN  Outcome: Progressing  11/24/2024 0014 by Roro Eubanks RN  Outcome: Progressing     Problem: Pain  Goal:

## 2024-11-24 NOTE — CARE COORDINATION
Per bedside nurse she believes pt may be a discharge home today and needs set up with [facility].  Call to pt son Ramona, he had this CM speak with wife Rody as she is pt caregiver at home. Per Rody, pt PCP is Dr. Roberto Mosley whom makes home visits, pt had [facility] in the past and they would like to use again. Reviewed pt PT evals and and family feel comfortable taking pt back home with [facility]. Pt has all equipment needed at home and mostly is wheelhcair bound.  Pt also receives PALL care at home.   Rody states if pt is to discharge home today they may need transport set up pending her husbands availability.   Referral faxed to [facility].   Call to [facility], vm left as they are not there on the weekends.

## 2024-11-24 NOTE — PLAN OF CARE
Problem: Discharge Planning  Goal: Discharge to home or other facility with appropriate resources  Outcome: Progressing     Problem: Skin/Tissue Integrity  Goal: Absence of new skin breakdown  Description: 1.  Monitor for areas of redness and/or skin breakdown  2.  Assess vascular access sites hourly  3.  Every 4-6 hours minimum:  Change oxygen saturation probe site  4.  Every 4-6 hours:  If on nasal continuous positive airway pressure, respiratory therapy assess nares and determine need for appliance change or resting period.  Outcome: Progressing     Problem: Confusion  Goal: Confusion, delirium, dementia, or psychosis is improved or at baseline  Description: INTERVENTIONS:  1. Assess for possible contributors to thought disturbance, including medications, impaired vision or hearing, underlying metabolic abnormalities, dehydration, psychiatric diagnoses, and notify attending LIP  2. [place] high risk fall precautions, as indicated  3. Provide frequent short contacts to provide reality reorientation, refocusing and direction  4. Decrease environmental stimuli, including noise as appropriate  5. Monitor and intervene to maintain adequate nutrition, hydration, elimination, sleep and activity  6. If unable to ensure safety without constant attention obtain sitter and review sitter guidelines with assigned personnel  7. Initiate Psychosocial CNS and Spiritual Care consult, as indicated  Outcome: Progressing     Problem: Safety - Adult  Goal: Free from fall injury  Outcome: Progressing     Problem: Pain  Goal: Verbalizes/displays adequate comfort level or baseline comfort level  Outcome: Progressing

## 2024-11-24 NOTE — DISCHARGE INSTR - DIET
Good nutrition is important when healing from an illness, injury, or surgery.  Follow any nutrition recommendations given to you during your hospital stay.   If you were given an oral nutrition supplement while in the hospital, continue to take this supplement at home.  You can take it with meals, in-between meals, and/or before bedtime. These supplements can be purchased at most local grocery stores, pharmacies, and chain [employer]-stores.   If you have any questions about your diet or nutrition, call the hospital and ask for the dietitian.    Diabetic diet

## 2024-11-25 NOTE — CARE COORDINATION
11/25/24 0731 AM -SPOKE W/ZHOU FROM Replaced by [facility] AND SAID THEY NEVER RECEIVED THE [facility] REFERRAL. REFERRAL FAXED AND CONFIRMED WITH ZHOU THAT SHE RECEIVED THE REFERRAL AND [facility] THAT WAS SENT THIS MORNING. ZHOU IS THE CM FOR [facility] AT Replaced by [facility] AND CAN BE REACHED [phone].

## 2024-11-26 NOTE — PROGRESS NOTES
0700  Assumed care of patient.     0800  Meds given per MAR. /85 this morning. Hydralazine PRN given . Head to toe complete. See flow sheets. Patient calm and cooperative this morning.   
0700 Assumed care of patient    0900 Shift assessment complete, see flowsheets. Morning medications administered per MAR without difficulty. Daughter in law Rody called and was given an update. Rody states that she will be up later today to visit. Avasys remains in place for safety    1640 Pt /92. PRN hydralazine given, recheck 171/80. Dr. Anguiano notified    Electronically signed by Yaima Saucedo RN on 11/22/2024 at 8:43 AM          
1730 Patient arrived to unit via cart. Patient alert to self and place only at this time. Patient impulsive but redirectable, continuously attempting to get out of bed at this time stating that her daughter in law is waiting outside for her. Informed patient that her daughter in law was at home, patient then points to corner of room and states \"that lady told me to go find her\". /74, medicated with PRN labetalol per orders. Phone call to daughter-in-law Rody with whom patient lives at home. Rody reports that patient is normally independent at home and alert and oriented, but over the past few days patient has been increasingly confused with strange behavior such as speaking to people who were not there and spreading feces around the home. Avasys camera placed in room for safety.     Electronically signed by Yaima Saucedo RN on 11/21/2024 at 5:54 PM      
Family called about patient's discharge. Transport scheduled for 4pm.     1700  Patient d/c   
Haldol given PRN. Patient attempting to get out of bed and yelling out.   
Hospitalist Progress Note      PCP: Unknown, Provider, MD    Date of Admission: 11/21/2024    Chief Complaint:  no acute events, afebrile, stable HD, on RA    Medications:  Reviewed    Infusion Medications    dextrose      sodium chloride       Scheduled Medications    amLODIPine  10 mg Oral Daily    apixaban  5 mg Oral BID    aspirin  81 mg Oral Daily    atorvastatin  80 mg Oral Nightly    citalopram  20 mg Oral Daily    levothyroxine  112 mcg Oral Daily    losartan  100 mg Oral Daily    metoprolol tartrate  25 mg Oral BID    mirtazapine  15 mg Oral QHS    pantoprazole  40 mg Oral QAM AC    sodium chloride flush  5-40 mL IntraVENous 2 times per day    insulin lispro  0-4 Units SubCUTAneous 4x Daily AC & HS    cefTRIAXone (ROCEPHIN) IV  1,000 mg IntraVENous Q24H    azithromycin  500 mg IntraVENous Q24H     PRN Meds: hydrALAZINE, glucose, dextrose bolus **OR** dextrose bolus, glucagon (rDNA), dextrose, sodium chloride flush, sodium chloride, potassium chloride **OR** potassium alternative oral replacement **OR** potassium chloride, magnesium sulfate, ondansetron **OR** ondansetron, polyethylene glycol, acetaminophen **OR** acetaminophen, labetalol, haloperidol lactate      Intake/Output Summary (Last 24 hours) at 11/22/2024 1343  Last data filed at 11/22/2024 1253  Gross per 24 hour   Intake 1116.5 ml   Output --   Net 1116.5 ml       Exam:    BP (!) 168/94   Pulse 94   Temp 98.6 °F (37 °C) (Oral)   Resp 19   Ht 1.651 m (5' 5\")   Wt 79.4 kg (175 lb)   SpO2 98%   BMI 29.12 kg/m²     General appearance: awake, cooperative  Lungs: clear to auscultation bilaterally  Heart: S1/S2, irregular  Abdomen: soft, active BS  Extremities:  no edema      Labs:   Recent Labs     11/21/24  1442 11/22/24  0523   WBC 5.7 6.0   HGB 12.9 12.6   HCT 40.7 40.7    299     Recent Labs     11/21/24  1442 11/22/24  0523    141   K 3.5 4.7    103   CO2 29 28   BUN 7* 7*   CREATININE 0.67 0.60   CALCIUM 9.8 9.8   PHOS  
Hospitalist Progress Note      PCP: Unknown, Provider, MD    Date of Admission: 11/21/2024    Chief Complaint:  no acute events, afebrile, stable HD, on RA. Remains confused and agitated requiring Haldol per nursing staff    Medications:  Reviewed    Infusion Medications    dextrose      sodium chloride       Scheduled Medications    memantine  5 mg Oral BID    cefTRIAXone (ROCEPHIN) 1,000 mg in sterile water 10 mL IV syringe  1,000 mg IntraVENous Q24H    amLODIPine  10 mg Oral Daily    apixaban  5 mg Oral BID    aspirin  81 mg Oral Daily    atorvastatin  80 mg Oral Nightly    citalopram  20 mg Oral Daily    levothyroxine  112 mcg Oral Daily    losartan  100 mg Oral Daily    metoprolol tartrate  25 mg Oral BID    mirtazapine  15 mg Oral QHS    pantoprazole  40 mg Oral QAM AC    sodium chloride flush  5-40 mL IntraVENous 2 times per day    insulin lispro  0-4 Units SubCUTAneous 4x Daily AC & HS    azithromycin  500 mg IntraVENous Q24H     PRN Meds: hydrALAZINE, glucose, dextrose bolus **OR** dextrose bolus, glucagon (rDNA), dextrose, sodium chloride flush, sodium chloride, potassium chloride **OR** potassium alternative oral replacement **OR** potassium chloride, magnesium sulfate, ondansetron **OR** ondansetron, polyethylene glycol, acetaminophen **OR** acetaminophen, haloperidol lactate      Intake/Output Summary (Last 24 hours) at 11/23/2024 1405  Last data filed at 11/23/2024 1215  Gross per 24 hour   Intake 1000 ml   Output 1100 ml   Net -100 ml       Exam:    BP (!) 163/62   Pulse 84   Temp 97.7 °F (36.5 °C) (Axillary)   Resp 17   Ht 1.651 m (5' 5\")   Wt 79.4 kg (175 lb)   SpO2 96%   BMI 29.12 kg/m²     General appearance: awake, cooperative  Lungs: clear to auscultation bilaterally  Heart: S1/S2, irregular  Abdomen: soft, active BS  Extremities:  no edema      Labs:   Recent Labs     11/21/24  1442 11/22/24  0523 11/23/24  0624   WBC 5.7 6.0 10.3   HGB 12.9 12.6 13.3   HCT 40.7 40.7 41.2    299 
Monitor room called and reported a few 2 second pauses. Pt running jose cruz in the 30-40s.  Checked on pt, she was sleeping. 145/56 HR54 99%on RA. MODESTO Flanagan made aware via perfect serve.    Electronically signed by Mari Arroyo RN on 11/22/2024 at 2:31 AM    
PRN hydralazine given at 2017 for /94.  Recheck manually at 2148 and got 168/86. MODESTO Flanagan made aware via perfect serve.     2226: Additional 10 mg hydralazine given per order.     Electronically signed by Mari Arroyo RN on 11/22/2024 at 10:06 PM    
Physical Therapy  Facility/Department: [facility] MED SURG [id number]/[id number]  Physical Therapy Discharge      NAME: Paige Dominguez    : 1935 (89 y.o.)  MRN: [id number]    Account: [id number]  Gender: female      Patient has been discharged from acute care hospital. DC patient from current PT program.      Electronically signed by Megan Bethea PT on 24 at 3:51 PM EST    
Physical Therapy Med Surg Initial Assessment  Facility/Department: [address] MED SURG UNIT  Room: [place] [address]       NAME: Paige Dominguez  : 1935 (89 y.o.)  MRN: [id number]  CODE STATUS: Full Code    Date of Service: 2024    Patient Diagnosis(es): Delirium [R41.0]  Encephalopathy acute [G93.40]  Pneumonia of right upper lobe due to infectious organism [J18.9]   Chief Complaint   Patient presents with    Dementia     Patient Active Problem List    Diagnosis Date Noted    Pneumonia of right upper lobe due to infectious organism 2024    Encephalopathy acute 2024        History reviewed. No pertinent past medical history.  History reviewed. No pertinent surgical history.    Chart Reviewed: Yes  Family / Caregiver Present: No    Restrictions:  Restrictions/Precautions: Fall Risk     SUBJECTIVE:   Subjective: Pt admitted with change in mental status, acute encephalopathy, possible pneumonia. Pt eager to get out of bed and return home.    Pain      Pre Pain:  Pain Rating (0-10 pain scale):  0 /10   [x] Agreeable for treatment      POST-PAIN:  Pain Rating (0-10 pain scale):  8 /10 , neck  Location and pain description same as pre-treatment unless indicated.   Action: [] NA   [x]  RN notified       Prior Level of Function:  Social/Functional History  Lives With: Family, Son  Type of Home: House  Home Layout: Two level, Able to Live on Main level with bedroom/bathroom  Home Access: Stairs to enter without rails  Entrance Stairs - Number of Steps: 1  Bathroom Shower/Tub: Tub/Shower unit  Bathroom Toilet: Standard  Bathroom Equipment: Shower chair, Toilet raiser  Bathroom Accessibility: Accessible  Home Equipment: Walker - Rolling, Wheelchair - Manual, Grab bars, Hospital bed  Receives Help From: Family  ADL Assistance: Needs assistance (she can feed herself, she is dependent for all other adl's.)  Toileting: Needs assistance  Homemaking Responsibilities: No  Ambulation Assistance: Needs assistance (pt 
Pt attempted to climb out of bed several times. Yness called multiple times to help keep patient safe from getting up. PT appeared very anxious and restless. Pt said to look at the wall and her legs because they were covered in bugs. There were no bugs of any kind in the patients room. PRN Haldol given per MAR @ 2100.   
1:28 PM  
all clinical decisions on the neurological status of this patient.  Dementia with exacerbation.  We are trying to find exactly the cause of this though this could be just of further decline.  Patient is already on Eliquis.  Patient bradycardia cardiology on the case.  We have not recommend intervention except that we will add namenda  and as this may help hallucinations.  60% time spent on evaluating patient    11/24/2024:  Acute encephalopathy in the setting of hypertensive urgency, pneumonia, A-fib with intermittent bradycardia and sinus pauses with underlying dementia with behavioral disturbances and hallucinations  Continue Namenda 5 mg twice daily  CT of the head negative for acute findings  Patient to be discharged home today.  Okay from neurology standpoint.  Follow-up 6 weeks.    Collaborating physicians: Dr Baumann    Electronically signed by YANETH Zarco CNP on 11/24/2024 at 3:39 PM

## 2024-11-28 DIAGNOSIS — M54.50 CHRONIC MIDLINE LOW BACK PAIN WITHOUT SCIATICA: Primary | ICD-10-CM

## 2024-11-28 DIAGNOSIS — G89.29 CHRONIC MIDLINE LOW BACK PAIN WITHOUT SCIATICA: Primary | ICD-10-CM

## 2024-11-28 RX ORDER — TRAMADOL HYDROCHLORIDE 50 MG/1
50 TABLET ORAL 2 TIMES DAILY
Qty: 14 TABLET | Refills: 0 | Status: SHIPPED | OUTPATIENT
Start: 2024-11-28 | End: 2024-12-05

## 2024-11-29 ENCOUNTER — OFFICE VISIT (OUTPATIENT)
Dept: GERIATRIC MEDICINE | Age: 89
End: 2024-11-29

## 2024-11-29 DIAGNOSIS — I48.91 ATRIAL FIBRILLATION, UNSPECIFIED TYPE (HCC): ICD-10-CM

## 2024-11-29 DIAGNOSIS — R53.1 WEAKNESS: Primary | ICD-10-CM

## 2024-11-29 DIAGNOSIS — F32.A DEPRESSION, UNSPECIFIED DEPRESSION TYPE: ICD-10-CM

## 2024-11-29 DIAGNOSIS — E03.9 HYPOTHYROIDISM, UNSPECIFIED TYPE: ICD-10-CM

## 2024-11-29 DIAGNOSIS — F03.90 DEMENTIA WITHOUT BEHAVIORAL DISTURBANCE (HCC): ICD-10-CM

## 2024-11-29 DIAGNOSIS — I10 HYPERTENSION, UNSPECIFIED TYPE: ICD-10-CM

## 2024-11-29 DIAGNOSIS — E11.59 TYPE 2 DIABETES MELLITUS WITH OTHER CIRCULATORY COMPLICATION, WITHOUT LONG-TERM CURRENT USE OF INSULIN (HCC): ICD-10-CM

## 2024-12-02 ENCOUNTER — OFFICE VISIT (OUTPATIENT)
Dept: GERIATRIC MEDICINE | Age: 89
End: 2024-12-02

## 2024-12-02 DIAGNOSIS — E03.9 HYPOTHYROIDISM, UNSPECIFIED TYPE: ICD-10-CM

## 2024-12-02 DIAGNOSIS — I48.91 ATRIAL FIBRILLATION, UNSPECIFIED TYPE (HCC): ICD-10-CM

## 2024-12-02 DIAGNOSIS — F32.A DEPRESSION, UNSPECIFIED DEPRESSION TYPE: ICD-10-CM

## 2024-12-02 DIAGNOSIS — R53.1 WEAKNESS: Primary | ICD-10-CM

## 2024-12-02 DIAGNOSIS — F03.90 DEMENTIA WITHOUT BEHAVIORAL DISTURBANCE (HCC): ICD-10-CM

## 2024-12-02 DIAGNOSIS — E11.59 TYPE 2 DIABETES MELLITUS WITH OTHER CIRCULATORY COMPLICATION, WITHOUT LONG-TERM CURRENT USE OF INSULIN (HCC): ICD-10-CM

## 2024-12-02 DIAGNOSIS — I10 HYPERTENSION, UNSPECIFIED TYPE: ICD-10-CM

## 2024-12-02 RX ORDER — ALBUTEROL SULFATE 0.83 MG/ML
2.5 SOLUTION RESPIRATORY (INHALATION) EVERY 4 HOURS PRN
COMMUNITY

## 2024-12-03 ENCOUNTER — OFFICE VISIT (OUTPATIENT)
Dept: GERIATRIC MEDICINE | Age: 89
End: 2024-12-03

## 2024-12-03 DIAGNOSIS — F03.90 DEMENTIA WITHOUT BEHAVIORAL DISTURBANCE (HCC): ICD-10-CM

## 2024-12-03 DIAGNOSIS — E03.9 HYPOTHYROIDISM, UNSPECIFIED TYPE: ICD-10-CM

## 2024-12-03 DIAGNOSIS — F32.A DEPRESSION, UNSPECIFIED DEPRESSION TYPE: ICD-10-CM

## 2024-12-03 DIAGNOSIS — E11.59 TYPE 2 DIABETES MELLITUS WITH OTHER CIRCULATORY COMPLICATION, WITHOUT LONG-TERM CURRENT USE OF INSULIN (HCC): ICD-10-CM

## 2024-12-03 DIAGNOSIS — I48.91 ATRIAL FIBRILLATION, UNSPECIFIED TYPE (HCC): ICD-10-CM

## 2024-12-03 DIAGNOSIS — I10 HYPERTENSION, UNSPECIFIED TYPE: ICD-10-CM

## 2024-12-03 DIAGNOSIS — R53.1 WEAKNESS: Primary | ICD-10-CM

## 2024-12-04 ENCOUNTER — OFFICE VISIT (OUTPATIENT)
Dept: GERIATRIC MEDICINE | Age: 89
End: 2024-12-04

## 2024-12-04 DIAGNOSIS — E03.9 HYPOTHYROIDISM, UNSPECIFIED TYPE: ICD-10-CM

## 2024-12-04 DIAGNOSIS — I48.91 ATRIAL FIBRILLATION, UNSPECIFIED TYPE (HCC): ICD-10-CM

## 2024-12-04 DIAGNOSIS — F32.A DEPRESSION, UNSPECIFIED DEPRESSION TYPE: ICD-10-CM

## 2024-12-04 DIAGNOSIS — F03.90 DEMENTIA WITHOUT BEHAVIORAL DISTURBANCE (HCC): ICD-10-CM

## 2024-12-04 DIAGNOSIS — I10 HYPERTENSION, UNSPECIFIED TYPE: ICD-10-CM

## 2024-12-04 DIAGNOSIS — R53.1 WEAKNESS: Primary | ICD-10-CM

## 2024-12-04 DIAGNOSIS — E11.59 TYPE 2 DIABETES MELLITUS WITH OTHER CIRCULATORY COMPLICATION, WITHOUT LONG-TERM CURRENT USE OF INSULIN (HCC): ICD-10-CM

## 2024-12-05 ENCOUNTER — OFFICE VISIT (OUTPATIENT)
Dept: GERIATRIC MEDICINE | Age: 89
End: 2024-12-05

## 2024-12-05 DIAGNOSIS — R53.1 WEAKNESS: Primary | ICD-10-CM

## 2024-12-05 DIAGNOSIS — I48.91 ATRIAL FIBRILLATION, UNSPECIFIED TYPE (HCC): ICD-10-CM

## 2024-12-05 DIAGNOSIS — F03.90 DEMENTIA WITHOUT BEHAVIORAL DISTURBANCE (HCC): ICD-10-CM

## 2024-12-05 DIAGNOSIS — I10 HYPERTENSION, UNSPECIFIED TYPE: ICD-10-CM

## 2024-12-05 DIAGNOSIS — E03.9 HYPOTHYROIDISM, UNSPECIFIED TYPE: ICD-10-CM

## 2024-12-05 DIAGNOSIS — F32.A DEPRESSION, UNSPECIFIED DEPRESSION TYPE: ICD-10-CM

## 2024-12-05 DIAGNOSIS — E11.59 TYPE 2 DIABETES MELLITUS WITH OTHER CIRCULATORY COMPLICATION, WITHOUT LONG-TERM CURRENT USE OF INSULIN (HCC): ICD-10-CM

## 2024-12-09 ENCOUNTER — OFFICE VISIT (OUTPATIENT)
Dept: GERIATRIC MEDICINE | Age: 89
End: 2024-12-09
Payer: MEDICARE

## 2024-12-09 DIAGNOSIS — I48.91 ATRIAL FIBRILLATION, UNSPECIFIED TYPE (HCC): ICD-10-CM

## 2024-12-09 DIAGNOSIS — I10 HYPERTENSION, UNSPECIFIED TYPE: ICD-10-CM

## 2024-12-09 DIAGNOSIS — R53.1 WEAKNESS: Primary | ICD-10-CM

## 2024-12-09 DIAGNOSIS — F32.A DEPRESSION, UNSPECIFIED DEPRESSION TYPE: ICD-10-CM

## 2024-12-09 DIAGNOSIS — E03.9 HYPOTHYROIDISM, UNSPECIFIED TYPE: ICD-10-CM

## 2024-12-09 DIAGNOSIS — F03.90 DEMENTIA WITHOUT BEHAVIORAL DISTURBANCE (HCC): ICD-10-CM

## 2024-12-09 DIAGNOSIS — E11.59 TYPE 2 DIABETES MELLITUS WITH OTHER CIRCULATORY COMPLICATION, WITHOUT LONG-TERM CURRENT USE OF INSULIN (HCC): ICD-10-CM

## 2024-12-09 PROCEDURE — G8484 FLU IMMUNIZE NO ADMIN: HCPCS | Performed by: INTERNAL MEDICINE

## 2024-12-09 PROCEDURE — 1123F ACP DISCUSS/DSCN MKR DOCD: CPT | Performed by: INTERNAL MEDICINE

## 2024-12-09 PROCEDURE — 3044F HG A1C LEVEL LT 7.0%: CPT | Performed by: INTERNAL MEDICINE

## 2024-12-09 PROCEDURE — 99308 SBSQ NF CARE LOW MDM 20: CPT | Performed by: INTERNAL MEDICINE

## 2024-12-10 ENCOUNTER — OFFICE VISIT (OUTPATIENT)
Dept: GERIATRIC MEDICINE | Age: 89
End: 2024-12-10
Payer: MEDICARE

## 2024-12-10 DIAGNOSIS — F03.90 DEMENTIA WITHOUT BEHAVIORAL DISTURBANCE (HCC): ICD-10-CM

## 2024-12-10 DIAGNOSIS — I10 HYPERTENSION, UNSPECIFIED TYPE: ICD-10-CM

## 2024-12-10 DIAGNOSIS — I48.91 ATRIAL FIBRILLATION, UNSPECIFIED TYPE (HCC): ICD-10-CM

## 2024-12-10 DIAGNOSIS — E11.59 TYPE 2 DIABETES MELLITUS WITH OTHER CIRCULATORY COMPLICATION, WITHOUT LONG-TERM CURRENT USE OF INSULIN (HCC): ICD-10-CM

## 2024-12-10 DIAGNOSIS — E03.9 HYPOTHYROIDISM, UNSPECIFIED TYPE: ICD-10-CM

## 2024-12-10 DIAGNOSIS — F32.A DEPRESSION, UNSPECIFIED DEPRESSION TYPE: ICD-10-CM

## 2024-12-10 DIAGNOSIS — R53.1 WEAKNESS: Primary | ICD-10-CM

## 2024-12-10 PROCEDURE — 99308 SBSQ NF CARE LOW MDM 20: CPT | Performed by: INTERNAL MEDICINE

## 2024-12-10 PROCEDURE — G8484 FLU IMMUNIZE NO ADMIN: HCPCS | Performed by: INTERNAL MEDICINE

## 2024-12-10 PROCEDURE — 1123F ACP DISCUSS/DSCN MKR DOCD: CPT | Performed by: INTERNAL MEDICINE

## 2024-12-10 PROCEDURE — 3044F HG A1C LEVEL LT 7.0%: CPT | Performed by: INTERNAL MEDICINE

## 2024-12-11 NOTE — PROGRESS NOTES
History and Physical      CHIEF COMPLAINT:  Pneumonia     History of Present Illness:      A 89 y.o. female who is being seen at [facility] after admit to the hospital for pneumonia and altered mental status. The patient has known lung cancer with radiation changes to the lungs. She was noted to have increased weakness.     REVIEW OF SYSTEMS:  A complete 10 Point review of systems was preformed and negative unless previously stated      PMH:  Past Medical History:   Diagnosis Date    Chronic kidney disease     Dementia (HCC)     Depression     Hypertension     Lung cancer (HCC)        Surgical History:  No past surgical history on file.    Medications Prior to Admission:    Prior to Admission medications    Medication Sig Start Date End Date Taking? Authorizing Provider   albuterol (PROVENTIL) (2.5 MG/3ML) 0.083% nebulizer solution Take 3 mLs by nebulization every 4 hours as needed for Wheezing or Shortness of Breath    Bucky Chowdhury MD   memantine (NAMENDA) 5 MG tablet Take 1 tablet by mouth 2 times daily 11/24/24   Jorge Anguiano MD   omeprazole (PRILOSEC) 20 MG delayed release capsule Take 1 capsule by mouth 2 times daily    Bucky Chowdhury MD   nystatin (MYCOSTATIN) 025785 UNIT/GM powder Apply 1 Application topically 2 times daily as needed Indications: Skin Infection due to Candida Yeast 6/11/24   Bucky Chowdhury MD   mirtazapine (REMERON) 15 MG tablet Take 1 tablet by mouth nightly Indications: Decrease in Appetite    Bucky Chowdhury MD   metoprolol tartrate (LOPRESSOR) 25 MG tablet Take 1 tablet by mouth 2 times daily    Bucky Chowdhury MD   metFORMIN (GLUCOPHAGE) 1000 MG tablet Take 1 tablet by mouth 2 times daily (with meals)    Bucky Chowdhury MD   levothyroxine (SYNTHROID) 112 MCG tablet Take 1 tablet by mouth Daily 6/4/24   Bucky Chowdhury MD   losartan (COZAAR) 100 MG tablet Take 1 tablet by mouth daily    Bucky Chowdhury MD   glipiZIDE (GLUCOTROL) 5 MG

## 2024-12-13 ENCOUNTER — OFFICE VISIT (OUTPATIENT)
Dept: GERIATRIC MEDICINE | Age: 89
End: 2024-12-13

## 2024-12-13 DIAGNOSIS — E03.9 HYPOTHYROIDISM, UNSPECIFIED TYPE: ICD-10-CM

## 2024-12-13 DIAGNOSIS — I10 HYPERTENSION, UNSPECIFIED TYPE: ICD-10-CM

## 2024-12-13 DIAGNOSIS — F32.A DEPRESSION, UNSPECIFIED DEPRESSION TYPE: ICD-10-CM

## 2024-12-13 DIAGNOSIS — R53.1 WEAKNESS: Primary | ICD-10-CM

## 2024-12-13 DIAGNOSIS — E11.59 TYPE 2 DIABETES MELLITUS WITH OTHER CIRCULATORY COMPLICATION, WITHOUT LONG-TERM CURRENT USE OF INSULIN (HCC): ICD-10-CM

## 2024-12-13 DIAGNOSIS — F03.90 DEMENTIA WITHOUT BEHAVIORAL DISTURBANCE (HCC): ICD-10-CM

## 2024-12-13 DIAGNOSIS — I48.91 ATRIAL FIBRILLATION, UNSPECIFIED TYPE (HCC): ICD-10-CM

## 2024-12-14 NOTE — PROGRESS NOTES
SNF PROGRESS NOTE      Cc-  Pneumonia       Patient is a Paige Dominguez 89 y.o. female who is being seen at [facility] after admit to the hospital for pneumonia and altered mental status. The patient has known lung cancer with radiation changes to the lungs. She was noted to have increased weakness.     Patient is sitting up in the chair at bedside and is pleasant. She denies any complaints of pain. She is eating well.         Past Medical History:   Diagnosis Date    Chronic kidney disease     Dementia (HCC)     Depression     Hypertension     Lung cancer (HCC)      Hydrochlorothiazide and Penicillins    VS reviewed    Gen- Alert and oriented x 2   Heart- RRR no murmur no LE edema   Lungs- CTA b/l no resp distress RA oxygen   Abd- bs x 4           Assessment and Plan    Weakness from recent Pneumonia  PT OT   Dementia   Remeron   namenda  HTN   Metoprolol   Losartan   Norvasc   Depression   celexa  DM  Glipizide   Metformin   CKD stage 3   Hypothyroid   Synthroid   A Fib   Lopressor   Eliquis       Estela Rae DO, FACOI     Electronically signed by: Estela Rae DO on 12/2/2024

## 2024-12-16 ENCOUNTER — OFFICE VISIT (OUTPATIENT)
Dept: GERIATRIC MEDICINE | Age: 89
End: 2024-12-16
Payer: MEDICARE

## 2024-12-16 DIAGNOSIS — R53.1 WEAKNESS: Primary | ICD-10-CM

## 2024-12-16 DIAGNOSIS — F32.A DEPRESSION, UNSPECIFIED DEPRESSION TYPE: ICD-10-CM

## 2024-12-16 DIAGNOSIS — E03.9 HYPOTHYROIDISM, UNSPECIFIED TYPE: ICD-10-CM

## 2024-12-16 DIAGNOSIS — F03.90 DEMENTIA WITHOUT BEHAVIORAL DISTURBANCE (HCC): ICD-10-CM

## 2024-12-16 DIAGNOSIS — I10 HYPERTENSION, UNSPECIFIED TYPE: ICD-10-CM

## 2024-12-16 DIAGNOSIS — E11.59 TYPE 2 DIABETES MELLITUS WITH OTHER CIRCULATORY COMPLICATION, WITHOUT LONG-TERM CURRENT USE OF INSULIN (HCC): ICD-10-CM

## 2024-12-16 DIAGNOSIS — I48.91 ATRIAL FIBRILLATION, UNSPECIFIED TYPE (HCC): ICD-10-CM

## 2024-12-16 PROCEDURE — 1123F ACP DISCUSS/DSCN MKR DOCD: CPT | Performed by: INTERNAL MEDICINE

## 2024-12-16 PROCEDURE — 99308 SBSQ NF CARE LOW MDM 20: CPT | Performed by: INTERNAL MEDICINE

## 2024-12-16 PROCEDURE — 3044F HG A1C LEVEL LT 7.0%: CPT | Performed by: INTERNAL MEDICINE

## 2024-12-16 PROCEDURE — G8484 FLU IMMUNIZE NO ADMIN: HCPCS | Performed by: INTERNAL MEDICINE

## 2024-12-16 NOTE — PROGRESS NOTES
SNF PROGRESS NOTE      Cc- Pneumonia       Patient is a Paige Dominguez 89 y.o. female who is being seen at [facility] after admit to the hospital for pneumonia and altered mental status. The patient has known lung cancer with radiation changes to the lungs. She was noted to have increased weakness.     Patient is sitting up in her chair. She is eating well and pleasant. She denies any complaints of pain.         Past Medical History:   Diagnosis Date    Chronic kidney disease     Dementia (HCC)     Depression     Hypertension     Lung cancer (HCC)      Hydrochlorothiazide and Penicillins    VS reviewed      Gen- Alert and oriented x 2   Heart- RRR no murmur no LE edema   Lungs- CTA b/l no resp distress RA oxygen   Abd- bs x 4         Assessment and Plan    Weakness from recent Pneumonia  PT OT   Dementia   Remeron   namenda  HTN   Metoprolol   Losartan   Norvasc   Depression   celexa  DM  Glipizide   Metformin   CKD stage 3   Hypothyroid   Synthroid   A Fib   Lopressor   Eliquis       Estela Rae DO, FACOI     Electronically signed by: Estela Rae DO on 12/9/2024

## 2024-12-17 NOTE — PROGRESS NOTES
SNF PROGRESS NOTE      Cc- Pneumonia       Patient is a Paige Dominguez 89 y.o. female  who is being seen at [facility] after admit to the hospital for pneumonia and altered mental status. The patient has known lung cancer with radiation changes to the lungs. She was noted to have increased weakness.     Patient is sitting in the chair in her room and working with therapy. She denies any complaints of pain and eating well.         Past Medical History:   Diagnosis Date    Chronic kidney disease     Dementia (HCC)     Depression     Hypertension     Lung cancer (HCC)      Hydrochlorothiazide and Penicillins    VS reviewed      Gen- Alert and oriented x 2   Heart- RRR no murmur no LE edema   Lungs- CTA b/l no resp distress RA oxygen   Abd- bs x 4       Assessment and Plan    Weakness from recent Pneumonia  PT OT   Dementia   Remeron   namenda  HTN   Metoprolol   Losartan   Norvasc   Depression   celexa  DM  Glipizide   Metformin   CKD stage 3   Hypothyroid   Synthroid   A Fib   Lopressor   Eliquis       Estela Rae DO, FACOI     Electronically signed by: Estela Rae DO on 12/10/2024

## 2024-12-18 ENCOUNTER — OFFICE VISIT (OUTPATIENT)
Dept: GERIATRIC MEDICINE | Age: 89
End: 2024-12-18

## 2024-12-18 DIAGNOSIS — I10 HYPERTENSION, UNSPECIFIED TYPE: ICD-10-CM

## 2024-12-18 DIAGNOSIS — R53.1 WEAKNESS: Primary | ICD-10-CM

## 2024-12-18 DIAGNOSIS — E03.9 HYPOTHYROIDISM, UNSPECIFIED TYPE: ICD-10-CM

## 2024-12-18 DIAGNOSIS — E11.59 TYPE 2 DIABETES MELLITUS WITH OTHER CIRCULATORY COMPLICATION, WITHOUT LONG-TERM CURRENT USE OF INSULIN (HCC): ICD-10-CM

## 2024-12-18 DIAGNOSIS — F03.90 DEMENTIA WITHOUT BEHAVIORAL DISTURBANCE (HCC): ICD-10-CM

## 2024-12-18 DIAGNOSIS — I48.91 ATRIAL FIBRILLATION, UNSPECIFIED TYPE (HCC): ICD-10-CM

## 2024-12-18 DIAGNOSIS — F32.A DEPRESSION, UNSPECIFIED DEPRESSION TYPE: ICD-10-CM

## 2024-12-19 ENCOUNTER — OFFICE VISIT (OUTPATIENT)
Dept: GERIATRIC MEDICINE | Age: 89
End: 2024-12-19

## 2024-12-19 DIAGNOSIS — E03.9 HYPOTHYROIDISM, UNSPECIFIED TYPE: ICD-10-CM

## 2024-12-19 DIAGNOSIS — F32.A DEPRESSION, UNSPECIFIED DEPRESSION TYPE: ICD-10-CM

## 2024-12-19 DIAGNOSIS — I10 HYPERTENSION, UNSPECIFIED TYPE: ICD-10-CM

## 2024-12-19 DIAGNOSIS — I48.91 ATRIAL FIBRILLATION, UNSPECIFIED TYPE (HCC): ICD-10-CM

## 2024-12-19 DIAGNOSIS — R53.1 WEAKNESS: Primary | ICD-10-CM

## 2024-12-19 DIAGNOSIS — E11.59 TYPE 2 DIABETES MELLITUS WITH OTHER CIRCULATORY COMPLICATION, WITHOUT LONG-TERM CURRENT USE OF INSULIN (HCC): ICD-10-CM

## 2024-12-19 DIAGNOSIS — F03.90 DEMENTIA WITHOUT BEHAVIORAL DISTURBANCE (HCC): ICD-10-CM

## 2024-12-20 ENCOUNTER — OFFICE VISIT (OUTPATIENT)
Dept: GERIATRIC MEDICINE | Age: 89
End: 2024-12-20
Payer: MEDICARE

## 2024-12-20 DIAGNOSIS — F03.90 DEMENTIA WITHOUT BEHAVIORAL DISTURBANCE (HCC): ICD-10-CM

## 2024-12-20 DIAGNOSIS — R53.1 WEAKNESS: Primary | ICD-10-CM

## 2024-12-20 DIAGNOSIS — I10 HYPERTENSION, UNSPECIFIED TYPE: ICD-10-CM

## 2024-12-20 DIAGNOSIS — E11.59 TYPE 2 DIABETES MELLITUS WITH OTHER CIRCULATORY COMPLICATION, WITHOUT LONG-TERM CURRENT USE OF INSULIN (HCC): ICD-10-CM

## 2024-12-20 DIAGNOSIS — E03.9 HYPOTHYROIDISM, UNSPECIFIED TYPE: ICD-10-CM

## 2024-12-20 DIAGNOSIS — I48.91 ATRIAL FIBRILLATION, UNSPECIFIED TYPE (HCC): ICD-10-CM

## 2024-12-20 DIAGNOSIS — F32.A DEPRESSION, UNSPECIFIED DEPRESSION TYPE: ICD-10-CM

## 2024-12-20 PROCEDURE — 99308 SBSQ NF CARE LOW MDM 20: CPT | Performed by: INTERNAL MEDICINE

## 2024-12-20 PROCEDURE — 1123F ACP DISCUSS/DSCN MKR DOCD: CPT | Performed by: INTERNAL MEDICINE

## 2024-12-20 PROCEDURE — 3044F HG A1C LEVEL LT 7.0%: CPT | Performed by: INTERNAL MEDICINE

## 2024-12-20 NOTE — PROGRESS NOTES
SNF PROGRESS NOTE      Cc- Pneumonia       Patient is a Paige Dominguez 89 y.o. female who is being seen at [facility] after admit to the hospital for pneumonia and altered mental status. The patient has known lung cancer with radiation changes to the lungs. She was noted to have increased weakness.     Patient is sitting up in her chair. She is pleasant and eating well.         Past Medical History:   Diagnosis Date    Chronic kidney disease     Dementia (HCC)     Depression     Hypertension     Lung cancer (HCC)      Hydrochlorothiazide and Penicillins    VS reviewed       Gen- Alert and oriented x 2   Heart- RRR no murmur no LE edema   Lungs- CTA b/l no resp distress RA oxygen   Abd- bs x 4         Assessment and Plan  Weakness from recent Pneumonia  PT OT   Dementia   Remeron   namenda  HTN   Metoprolol   Losartan   Norvasc   Depression   celexa  DM  Glipizide   Metformin   CKD stage 3   Hypothyroid   Synthroid   A Fib   Lopressor   Eliquis         Estela Rae DO, FACOI     Electronically signed by: Estela Rae DO on 12/13/2024

## 2024-12-23 ENCOUNTER — OFFICE VISIT (OUTPATIENT)
Dept: GERIATRIC MEDICINE | Age: 89
End: 2024-12-23

## 2024-12-23 DIAGNOSIS — E03.9 HYPOTHYROIDISM, UNSPECIFIED TYPE: ICD-10-CM

## 2024-12-23 DIAGNOSIS — I48.91 ATRIAL FIBRILLATION, UNSPECIFIED TYPE (HCC): ICD-10-CM

## 2024-12-23 DIAGNOSIS — R53.1 WEAKNESS: Primary | ICD-10-CM

## 2024-12-23 DIAGNOSIS — F03.90 DEMENTIA WITHOUT BEHAVIORAL DISTURBANCE (HCC): ICD-10-CM

## 2024-12-23 DIAGNOSIS — I10 HYPERTENSION, UNSPECIFIED TYPE: ICD-10-CM

## 2024-12-23 DIAGNOSIS — F32.A DEPRESSION, UNSPECIFIED DEPRESSION TYPE: ICD-10-CM

## 2024-12-23 DIAGNOSIS — E11.59 TYPE 2 DIABETES MELLITUS WITH OTHER CIRCULATORY COMPLICATION, WITHOUT LONG-TERM CURRENT USE OF INSULIN (HCC): ICD-10-CM

## 2024-12-24 ENCOUNTER — OFFICE VISIT (OUTPATIENT)
Dept: GERIATRIC MEDICINE | Age: 89
End: 2024-12-24

## 2024-12-24 DIAGNOSIS — E11.59 TYPE 2 DIABETES MELLITUS WITH OTHER CIRCULATORY COMPLICATION, WITHOUT LONG-TERM CURRENT USE OF INSULIN (HCC): ICD-10-CM

## 2024-12-24 DIAGNOSIS — E03.9 HYPOTHYROIDISM, UNSPECIFIED TYPE: ICD-10-CM

## 2024-12-24 DIAGNOSIS — R53.1 WEAKNESS: Primary | ICD-10-CM

## 2024-12-24 DIAGNOSIS — I48.91 ATRIAL FIBRILLATION, UNSPECIFIED TYPE (HCC): ICD-10-CM

## 2024-12-24 DIAGNOSIS — I10 HYPERTENSION, UNSPECIFIED TYPE: ICD-10-CM

## 2024-12-24 DIAGNOSIS — F32.A DEPRESSION, UNSPECIFIED DEPRESSION TYPE: ICD-10-CM

## 2024-12-24 DIAGNOSIS — F03.90 DEMENTIA WITHOUT BEHAVIORAL DISTURBANCE (HCC): ICD-10-CM

## 2024-12-25 NOTE — PROGRESS NOTES
SNF PROGRESS NOTE      Cc- Pneumonia       Patient is a Paige Dominguez 89 y.o. female who is being seen at [facility] after admit to the hospital for pneumonia and altered mental status. The patient has known lung cancer with radiation changes to the lungs. She was noted to have increased weakness.     Patient is sitting up in her chair. Her weight remains the same. She is eating well.         Past Medical History:   Diagnosis Date    Chronic kidney disease     Dementia (HCC)     Depression     Hypertension     Lung cancer (HCC)      Hydrochlorothiazide and Penicillins    VS reviewed    Gen- Alert and oriented x 2   Heart- RRR no murmur no LE edema   Lungs- CTA b/l no resp distress RA oxygen   Abd- bs x 4           Assessment and Plan    Weakness from recent Pneumonia  PT OT   Dementia   Remeron   namenda  HTN   Metoprolol   Losartan   Norvasc   Depression   celexa  DM  Glipizide   Metformin   CKD stage 3   Hypothyroid   Synthroid   A Fib   Lopressor   Eliquis       Estela Rae DO, FACHONG     Electronically signed by: Estela Rae DO on 12/16/2024

## 2024-12-27 ENCOUNTER — OFFICE VISIT (OUTPATIENT)
Dept: GERIATRIC MEDICINE | Age: 89
End: 2024-12-27

## 2024-12-27 DIAGNOSIS — R53.1 WEAKNESS: Primary | ICD-10-CM

## 2024-12-27 DIAGNOSIS — E11.59 TYPE 2 DIABETES MELLITUS WITH OTHER CIRCULATORY COMPLICATION, WITHOUT LONG-TERM CURRENT USE OF INSULIN (HCC): ICD-10-CM

## 2024-12-27 DIAGNOSIS — I48.91 ATRIAL FIBRILLATION, UNSPECIFIED TYPE (HCC): ICD-10-CM

## 2024-12-27 DIAGNOSIS — F03.B0 MODERATE DEMENTIA, UNSPECIFIED DEMENTIA TYPE, UNSPECIFIED WHETHER BEHAVIORAL, PSYCHOTIC, OR MOOD DISTURBANCE OR ANXIETY (HCC): ICD-10-CM

## 2024-12-27 NOTE — PROGRESS NOTES
SNF PROGRESS NOTE      Cc- Pneumonia       Patient is a Paige Dominguez 89 y.o.   who is being seen at [facility] after admit to the hospital for pneumonia and altered mental status. The patient has known lung cancer with radiation changes to the lungs. She was noted to have increased weakness.        Patient is sitting in her chair. She denies any pain. She is eating ok.         Past Medical History:   Diagnosis Date    Chronic kidney disease     Dementia (HCC)     Depression     Hypertension     Lung cancer (HCC)      Hydrochlorothiazide and Penicillins    VS reviewed    Gen- Alert and oriented x 2   Heart- RRR no murmur no LE edema   Lungs- CTA b/l no resp distress RA oxygen   Abd- bs x 4              Assessment and Plan    Weakness from recent Pneumonia  PT OT   Dementia   Remeron   namenda  HTN   Metoprolol   Losartan   Norvasc   Depression   celexa  DM  Glipizide   Metformin   CKD stage 3   Hypothyroid   Synthroid   A Fib   Lopressor   Eliquis       Estela Rae DO, FACOI     Electronically signed by: Estela Rae DO on 12/18/2024   Patient seen and examined and agree with above note.   Await D/C patient. No active medical management

## 2024-12-28 ENCOUNTER — OFFICE VISIT (OUTPATIENT)
Dept: GERIATRIC MEDICINE | Age: 89
End: 2024-12-28

## 2024-12-28 DIAGNOSIS — F03.B0 MODERATE DEMENTIA, UNSPECIFIED DEMENTIA TYPE, UNSPECIFIED WHETHER BEHAVIORAL, PSYCHOTIC, OR MOOD DISTURBANCE OR ANXIETY (HCC): ICD-10-CM

## 2024-12-28 DIAGNOSIS — R53.1 WEAKNESS: Primary | ICD-10-CM

## 2024-12-28 DIAGNOSIS — E03.9 HYPOTHYROIDISM, UNSPECIFIED TYPE: ICD-10-CM

## 2024-12-28 DIAGNOSIS — E11.59 TYPE 2 DIABETES MELLITUS WITH OTHER CIRCULATORY COMPLICATION, WITHOUT LONG-TERM CURRENT USE OF INSULIN (HCC): ICD-10-CM

## 2024-12-28 DIAGNOSIS — I10 HYPERTENSION, UNSPECIFIED TYPE: ICD-10-CM

## 2024-12-28 DIAGNOSIS — I48.91 ATRIAL FIBRILLATION, UNSPECIFIED TYPE (HCC): ICD-10-CM

## 2024-12-28 DIAGNOSIS — F32.A DEPRESSION, UNSPECIFIED DEPRESSION TYPE: ICD-10-CM

## 2024-12-28 DIAGNOSIS — F03.90 DEMENTIA WITHOUT BEHAVIORAL DISTURBANCE (HCC): ICD-10-CM

## 2024-12-29 ENCOUNTER — OFFICE VISIT (OUTPATIENT)
Dept: GERIATRIC MEDICINE | Age: 89
End: 2024-12-29
Payer: MEDICARE

## 2024-12-29 DIAGNOSIS — I48.91 ATRIAL FIBRILLATION, UNSPECIFIED TYPE (HCC): ICD-10-CM

## 2024-12-29 DIAGNOSIS — I10 HYPERTENSION, UNSPECIFIED TYPE: ICD-10-CM

## 2024-12-29 DIAGNOSIS — F03.90 DEMENTIA WITHOUT BEHAVIORAL DISTURBANCE (HCC): ICD-10-CM

## 2024-12-29 DIAGNOSIS — R53.1 WEAKNESS: Primary | ICD-10-CM

## 2024-12-29 DIAGNOSIS — E03.9 HYPOTHYROIDISM, UNSPECIFIED TYPE: ICD-10-CM

## 2024-12-29 DIAGNOSIS — F03.B0 MODERATE DEMENTIA, UNSPECIFIED DEMENTIA TYPE, UNSPECIFIED WHETHER BEHAVIORAL, PSYCHOTIC, OR MOOD DISTURBANCE OR ANXIETY (HCC): ICD-10-CM

## 2024-12-29 DIAGNOSIS — F32.A DEPRESSION, UNSPECIFIED DEPRESSION TYPE: ICD-10-CM

## 2024-12-29 DIAGNOSIS — E11.59 TYPE 2 DIABETES MELLITUS WITH OTHER CIRCULATORY COMPLICATION, WITHOUT LONG-TERM CURRENT USE OF INSULIN (HCC): ICD-10-CM

## 2024-12-29 PROCEDURE — G8484 FLU IMMUNIZE NO ADMIN: HCPCS | Performed by: INTERNAL MEDICINE

## 2024-12-29 PROCEDURE — 1123F ACP DISCUSS/DSCN MKR DOCD: CPT | Performed by: INTERNAL MEDICINE

## 2024-12-29 PROCEDURE — 99308 SBSQ NF CARE LOW MDM 20: CPT | Performed by: INTERNAL MEDICINE

## 2024-12-30 ENCOUNTER — OFFICE VISIT (OUTPATIENT)
Dept: GERIATRIC MEDICINE | Age: 89
End: 2024-12-30

## 2024-12-30 DIAGNOSIS — F03.B0 MODERATE DEMENTIA, UNSPECIFIED DEMENTIA TYPE, UNSPECIFIED WHETHER BEHAVIORAL, PSYCHOTIC, OR MOOD DISTURBANCE OR ANXIETY (HCC): ICD-10-CM

## 2024-12-30 DIAGNOSIS — E03.9 HYPOTHYROIDISM, UNSPECIFIED TYPE: ICD-10-CM

## 2024-12-30 DIAGNOSIS — I10 HYPERTENSION, UNSPECIFIED TYPE: ICD-10-CM

## 2024-12-30 DIAGNOSIS — R53.1 WEAKNESS: Primary | ICD-10-CM

## 2024-12-30 DIAGNOSIS — F32.A DEPRESSION, UNSPECIFIED DEPRESSION TYPE: ICD-10-CM

## 2024-12-30 DIAGNOSIS — F03.90 DEMENTIA WITHOUT BEHAVIORAL DISTURBANCE (HCC): ICD-10-CM

## 2024-12-30 DIAGNOSIS — I48.91 ATRIAL FIBRILLATION, UNSPECIFIED TYPE (HCC): ICD-10-CM

## 2024-12-30 DIAGNOSIS — E11.59 TYPE 2 DIABETES MELLITUS WITH OTHER CIRCULATORY COMPLICATION, WITHOUT LONG-TERM CURRENT USE OF INSULIN (HCC): ICD-10-CM

## 2024-12-30 NOTE — PROGRESS NOTES
SNF PROGRESS NOTE      Cc- Pneumonia       Patient is a Paige Dominguez 89 y.o. female who is being seen at [facility] after admit to the hospital for pneumonia and altered mental status. The patient has known lung cancer with radiation changes to the lungs. She was noted to have increased weakness.     Patient is sitting up in her room and she is eating well. She doesn't have any complaints.         Past Medical History:   Diagnosis Date    Chronic kidney disease     Dementia (HCC)     Depression     Hypertension     Lung cancer (HCC)      Hydrochlorothiazide and Penicillins    VS reviewed      Gen- Alert and oriented x 2   Heart- RRR no murmur no LE edema   Lungs- CTA b/l no resp distress RA oxygen   Abd- bs x 4       Assessment and Plan    Weakness from recent Pneumonia  PT OT   Dementia   Remeron   namenda  HTN   Metoprolol   Losartan   Norvasc   Depression   celexa  DM  Glipizide   Metformin   CKD stage 3   Hypothyroid   Synthroid   A Fib   Lopressor   Eliquis       Estela Rae DO, FACHONG     Electronically signed by: Estela Rae DO on 12/20/2024

## 2024-12-31 ENCOUNTER — OFFICE VISIT (OUTPATIENT)
Dept: GERIATRIC MEDICINE | Age: 89
End: 2024-12-31

## 2024-12-31 DIAGNOSIS — F03.B0 MODERATE DEMENTIA, UNSPECIFIED DEMENTIA TYPE, UNSPECIFIED WHETHER BEHAVIORAL, PSYCHOTIC, OR MOOD DISTURBANCE OR ANXIETY (HCC): ICD-10-CM

## 2024-12-31 DIAGNOSIS — F32.A DEPRESSION, UNSPECIFIED DEPRESSION TYPE: ICD-10-CM

## 2024-12-31 DIAGNOSIS — E11.59 TYPE 2 DIABETES MELLITUS WITH OTHER CIRCULATORY COMPLICATION, WITHOUT LONG-TERM CURRENT USE OF INSULIN (HCC): ICD-10-CM

## 2024-12-31 DIAGNOSIS — E03.9 HYPOTHYROIDISM, UNSPECIFIED TYPE: ICD-10-CM

## 2024-12-31 DIAGNOSIS — R53.1 WEAKNESS: Primary | ICD-10-CM

## 2024-12-31 DIAGNOSIS — I10 HYPERTENSION, UNSPECIFIED TYPE: ICD-10-CM

## 2024-12-31 DIAGNOSIS — I48.91 ATRIAL FIBRILLATION, UNSPECIFIED TYPE (HCC): ICD-10-CM

## 2025-01-01 NOTE — PROGRESS NOTES
SNF PROGRESS NOTE      Cc- Pneumonia       Patient is a Paige Dominguez 89 y.o. female who is being seen at [facility] after admit to the hospital for pneumonia and altered mental status. The patient has known lung cancer with radiation changes to the lungs. She was noted to have increased weakness.     Patient is sitting up in her room. Her affect is flat. She is eating fair.         Past Medical History:   Diagnosis Date    Chronic kidney disease     Dementia (HCC)     Depression     Hypertension     Lung cancer (HCC)      Hydrochlorothiazide and Penicillins    VS reviewed    Gen- Alert and oriented x 2   Heart- RRR no murmur no LE edema   Lungs- CTA b/l no resp distress RA oxygen   Abd- bs x 4         Assessment and Plan    Weakness from recent Pneumonia  PT OT   Dementia   Remeron   namenda  HTN   Metoprolol   Losartan   Norvasc   Depression   celexa  DM  Glipizide   Metformin   CKD stage 3   Hypothyroid   Synthroid   A Fib   Lopressor   Eliquis       Estela Rae DO, FACHONG     Electronically signed by: Estela Rae DO on 12/23/2024

## 2025-01-01 NOTE — PROGRESS NOTES
SNF PROGRESS NOTE      Cc- Pneumonia       Patient is a Paige Dominguez 89 y.o. female  who is being seen at [facility] after admit to the hospital for pneumonia and altered mental status. The patient has known lung cancer with radiation changes to the lungs. She was noted to have increased weakness     Patient sitting up in her room and looking out the window. She eats well. No complaints.         Past Medical History:   Diagnosis Date    Chronic kidney disease     Dementia (HCC)     Depression     Hypertension     Lung cancer (HCC)      Hydrochlorothiazide and Penicillins    VS reviewed      Gen- Alert and oriented x 2   Heart- RRR no murmur no LE edema   Lungs- CTA b/l no resp distress RA oxygen   Abd- bs x 4         Assessment and Plan    Weakness from recent Pneumonia  PT OT   Dementia   Remeron   namenda  HTN   Metoprolol   Losartan   Norvasc   Depression   celexa  DM  Glipizide   Metformin   CKD stage 3   Hypothyroid   Synthroid   A Fib   Lopressor   Eliquis       Estela Rae DO, FACHONG     Electronically signed by: Estela Rae DO on 12/24/2024

## 2025-01-02 PROBLEM — I48.91 ATRIAL FIBRILLATION, UNSPECIFIED TYPE (HCC): Status: ACTIVE | Noted: 2025-01-02

## 2025-01-02 PROBLEM — E11.59 TYPE 2 DIABETES MELLITUS WITH OTHER CIRCULATORY COMPLICATION, WITHOUT LONG-TERM CURRENT USE OF INSULIN (HCC): Status: ACTIVE | Noted: 2025-01-02

## 2025-01-02 PROBLEM — F03.B0 MODERATE DEMENTIA, UNSPECIFIED DEMENTIA TYPE, UNSPECIFIED WHETHER BEHAVIORAL, PSYCHOTIC, OR MOOD DISTURBANCE OR ANXIETY (HCC): Status: ACTIVE | Noted: 2025-01-02

## 2025-01-02 NOTE — PROGRESS NOTES
SNF PROGRESS NOTE      Cc-  Pneumonia       Patient is a Paige Dominguez 89 y.o. female who is being seen at [facility] after admit to the hospital for pneumonia and altered mental status. The patient has known lung cancer with radiation changes to the lungs. She was noted to have increased weakness     Patient sitting up in the chair with no issues. She denies any pain.         Past Medical History:   Diagnosis Date    Chronic kidney disease     Dementia (HCC)     Depression     Hypertension     Lung cancer (HCC)      Hydrochlorothiazide and Penicillins    VS reviewed    Gen- Alert and oriented x 2   Heart- RRR no murmur no LE edema   Lungs- CTA b/l no resp distress RA oxygen   Abd- bs x 4           Assessment and Plan    Weakness from recent Pneumonia  PT OT   Dementia   Remeron   namenda  HTN   Metoprolol   Losartan   Norvasc   Depression   celexa  DM  Glipizide   Metformin   CKD stage 3   Hypothyroid   Synthroid   A Fib   Lopressor   Eliquis          Estela Rae DO, FACOI     Electronically signed by: Estela Rae DO on 12/27/2024

## 2025-01-03 ENCOUNTER — OFFICE VISIT (OUTPATIENT)
Dept: GERIATRIC MEDICINE | Age: 89
End: 2025-01-03
Payer: MEDICARE

## 2025-01-03 DIAGNOSIS — I48.91 ATRIAL FIBRILLATION, UNSPECIFIED TYPE (HCC): ICD-10-CM

## 2025-01-03 DIAGNOSIS — F32.A DEPRESSION, UNSPECIFIED DEPRESSION TYPE: ICD-10-CM

## 2025-01-03 DIAGNOSIS — E03.9 HYPOTHYROIDISM, UNSPECIFIED TYPE: ICD-10-CM

## 2025-01-03 DIAGNOSIS — R53.1 WEAKNESS: Primary | ICD-10-CM

## 2025-01-03 DIAGNOSIS — I10 HYPERTENSION, UNSPECIFIED TYPE: ICD-10-CM

## 2025-01-03 DIAGNOSIS — E11.59 TYPE 2 DIABETES MELLITUS WITH OTHER CIRCULATORY COMPLICATION, WITHOUT LONG-TERM CURRENT USE OF INSULIN (HCC): ICD-10-CM

## 2025-01-03 DIAGNOSIS — F03.B0 MODERATE DEMENTIA, UNSPECIFIED DEMENTIA TYPE, UNSPECIFIED WHETHER BEHAVIORAL, PSYCHOTIC, OR MOOD DISTURBANCE OR ANXIETY (HCC): ICD-10-CM

## 2025-01-03 PROCEDURE — 1123F ACP DISCUSS/DSCN MKR DOCD: CPT | Performed by: INTERNAL MEDICINE

## 2025-01-03 PROCEDURE — 99308 SBSQ NF CARE LOW MDM 20: CPT | Performed by: INTERNAL MEDICINE

## 2025-01-04 NOTE — PROGRESS NOTES
SNF PROGRESS NOTE      Cc- Pneumonia       Patient is a Paige Dominguez 89 y.o. female who is being seen at [facility] after admit to the hospital for pneumonia and altered mental status. The patient has known lung cancer with radiation changes to the lungs. She was noted to have increased weakness     Patient is sitting up in her chair. She got cleaned up and had breakfast. No complaints.         Past Medical History:   Diagnosis Date    Chronic kidney disease     Dementia (HCC)     Depression     Hypertension     Lung cancer (HCC)      Hydrochlorothiazide and Penicillins    VS reviewed    Gen- Alert and oriented x 2   Heart- RRR no murmur no LE edema   Lungs- CTA b/l no resp distress RA oxygen   Abd- bs x 4           Assessment and Plan    Weakness from recent Pneumonia  PT OT   Dementia   Remeron   namenda  HTN   Metoprolol   Losartan   Norvasc   Depression   celexa  DM  Glipizide   Metformin   CKD stage 3   Hypothyroid   Synthroid   A Fib   Lopressor   Eliquis          Estela Rae DO, FACHONG     Electronically signed by: Estela Rae DO on 12/28/2024

## 2025-01-06 ENCOUNTER — OFFICE VISIT (OUTPATIENT)
Dept: GERIATRIC MEDICINE | Age: 89
End: 2025-01-06

## 2025-01-06 DIAGNOSIS — I10 HYPERTENSION, UNSPECIFIED TYPE: ICD-10-CM

## 2025-01-06 DIAGNOSIS — R53.1 WEAKNESS: Primary | ICD-10-CM

## 2025-01-06 DIAGNOSIS — I48.91 ATRIAL FIBRILLATION, UNSPECIFIED TYPE (HCC): ICD-10-CM

## 2025-01-06 DIAGNOSIS — F32.A DEPRESSION, UNSPECIFIED DEPRESSION TYPE: ICD-10-CM

## 2025-01-06 DIAGNOSIS — F03.90 DEMENTIA WITHOUT BEHAVIORAL DISTURBANCE (HCC): ICD-10-CM

## 2025-01-06 DIAGNOSIS — E03.9 HYPOTHYROIDISM, UNSPECIFIED TYPE: ICD-10-CM

## 2025-01-06 DIAGNOSIS — E11.59 TYPE 2 DIABETES MELLITUS WITH OTHER CIRCULATORY COMPLICATION, WITHOUT LONG-TERM CURRENT USE OF INSULIN (HCC): ICD-10-CM

## 2025-01-06 DIAGNOSIS — F03.B0 MODERATE DEMENTIA, UNSPECIFIED DEMENTIA TYPE, UNSPECIFIED WHETHER BEHAVIORAL, PSYCHOTIC, OR MOOD DISTURBANCE OR ANXIETY (HCC): ICD-10-CM

## 2025-01-06 NOTE — PROGRESS NOTES
SNF PROGRESS NOTE      Cc- Pneumonia       Patient is a Paige Dominguez 89 y.o. female who is being seen at [facility] after admit to the hospital for pneumonia and altered mental status. The patient has known lung cancer with radiation changes to the lungs. She was noted to have increased weakness     Patient is sitting in the chair. She Has no complaints. She is eating well.         Past Medical History:   Diagnosis Date    Chronic kidney disease     Dementia (HCC)     Depression     Hypertension     Lung cancer (HCC)      Hydrochlorothiazide and Penicillins    VS reviewed      Gen- Alert and oriented x 2   Heart- RRR no murmur no LE edema   Lungs- CTA b/l no resp distress RA oxygen   Abd- bs x 4       Assessment and Plan    Weakness from recent Pneumonia  PT OT   Dementia   Remeron   namenda  HTN   Metoprolol   Losartan   Norvasc   Depression   celexa  DM  Glipizide   Metformin   CKD stage 3   Hypothyroid   Synthroid   A Fib   Lopressor   Eliquadriana Rae DO, FACOI     Electronically signed by: Estela Rae DO on 12/29/2024

## 2025-01-07 ENCOUNTER — OFFICE VISIT (OUTPATIENT)
Dept: GERIATRIC MEDICINE | Age: 89
End: 2025-01-07

## 2025-01-07 DIAGNOSIS — I48.91 ATRIAL FIBRILLATION, UNSPECIFIED TYPE (HCC): ICD-10-CM

## 2025-01-07 DIAGNOSIS — E03.9 HYPOTHYROIDISM, UNSPECIFIED TYPE: ICD-10-CM

## 2025-01-07 DIAGNOSIS — F03.90 DEMENTIA WITHOUT BEHAVIORAL DISTURBANCE (HCC): ICD-10-CM

## 2025-01-07 DIAGNOSIS — E11.59 TYPE 2 DIABETES MELLITUS WITH OTHER CIRCULATORY COMPLICATION, WITHOUT LONG-TERM CURRENT USE OF INSULIN (HCC): ICD-10-CM

## 2025-01-07 DIAGNOSIS — I10 HYPERTENSION, UNSPECIFIED TYPE: ICD-10-CM

## 2025-01-07 DIAGNOSIS — F03.B0 MODERATE DEMENTIA, UNSPECIFIED DEMENTIA TYPE, UNSPECIFIED WHETHER BEHAVIORAL, PSYCHOTIC, OR MOOD DISTURBANCE OR ANXIETY (HCC): ICD-10-CM

## 2025-01-07 DIAGNOSIS — F32.A DEPRESSION, UNSPECIFIED DEPRESSION TYPE: ICD-10-CM

## 2025-01-07 DIAGNOSIS — R53.1 WEAKNESS: Primary | ICD-10-CM

## 2025-01-08 DIAGNOSIS — R52 PAIN: Primary | ICD-10-CM

## 2025-01-08 NOTE — PROGRESS NOTES
SNF PROGRESS NOTE      Cc-  Pneumonia       Patient is a Paige Dominguez 89 y.o. female who is being seen at [facility] after admit to the hospital for pneumonia and altered mental status. The patient has known lung cancer with radiation changes to the lungs. She was noted to have increased weakness     Patient without any issues. She is sitting up in the chair. Nursing with no issues.         Past Medical History:   Diagnosis Date    Chronic kidney disease     Dementia (HCC)     Depression     Hypertension     Lung cancer (HCC)      Hydrochlorothiazide and Penicillins    VS reviewed    Gen- Alert and oriented x 2   Heart- RRR no murmur no LE edema   Lungs- CTA b/l no resp distress RA oxygen   Abd- bs x 4         Assessment and Plan    Weakness from recent Pneumonia  PT OT   Dementia   Remeron   namenda  HTN   Metoprolol   Losartan   Norvasc   Depression   celexa  DM  Glipizide   Metformin   CKD stage 3   Hypothyroid   Synthroid   A Fib   Lopressor   Eliquis       Estela Rae DO, FACOI     Electronically signed by: Estela Rae DO on 12/31/2024

## 2025-01-08 NOTE — PROGRESS NOTES
SNF PROGRESS NOTE      Cc- Pneumonia       Patient is a Paige Dominguez 89 y.o. female who is being seen at [facility] after admit to the hospital for pneumonia and altered mental status. The patient has known lung cancer with radiation changes to the lungs. She was noted to have increased weakness     Patient labs reviewed. She is asking for cream to be placed on her knees.  Otherwise, she is sitting in her chair.         Past Medical History:   Diagnosis Date    Chronic kidney disease     Dementia (HCC)     Depression     Hypertension     Lung cancer (HCC)      Hydrochlorothiazide and Penicillins    VS reviewed    Gen- Alert and oriented x 2   Heart- RRR no murmur no LE edema   Lungs- CTA b/l no resp distress RA oxygen   Abd- bs x 4           Assessment and Plan    Weakness from recent Pneumonia  PT OT   Dementia   Remeron   namenda  HTN   Metoprolol   Losartan   Norvasc   Depression   celexa  DM  Glipizide   Metformin   CKD stage 3   Hypothyroid   Synthroid   A Fib   Lopressor   Eliquis       Estela Rae DO, FACOI     Electronically signed by: Estela Rae DO on 12/30/2024

## 2025-01-09 ENCOUNTER — OFFICE VISIT (OUTPATIENT)
Dept: GERIATRIC MEDICINE | Age: 89
End: 2025-01-09

## 2025-01-09 DIAGNOSIS — R53.1 WEAKNESS: ICD-10-CM

## 2025-01-09 DIAGNOSIS — F32.A DEPRESSION, UNSPECIFIED DEPRESSION TYPE: ICD-10-CM

## 2025-01-09 DIAGNOSIS — I10 HYPERTENSION, UNSPECIFIED TYPE: ICD-10-CM

## 2025-01-09 DIAGNOSIS — F03.B0 MODERATE DEMENTIA, UNSPECIFIED DEMENTIA TYPE, UNSPECIFIED WHETHER BEHAVIORAL, PSYCHOTIC, OR MOOD DISTURBANCE OR ANXIETY (HCC): Primary | ICD-10-CM

## 2025-01-09 DIAGNOSIS — E03.9 HYPOTHYROIDISM, UNSPECIFIED TYPE: ICD-10-CM

## 2025-01-09 DIAGNOSIS — E11.59 TYPE 2 DIABETES MELLITUS WITH OTHER CIRCULATORY COMPLICATION, WITHOUT LONG-TERM CURRENT USE OF INSULIN (HCC): ICD-10-CM

## 2025-01-09 DIAGNOSIS — I48.91 ATRIAL FIBRILLATION, UNSPECIFIED TYPE (HCC): ICD-10-CM

## 2025-01-09 RX ORDER — TRAMADOL HYDROCHLORIDE 50 MG/1
50 TABLET ORAL 2 TIMES DAILY
Qty: 10 TABLET | Refills: 0 | Status: SHIPPED | OUTPATIENT
Start: 2025-01-09 | End: 2025-01-14

## 2025-01-10 ENCOUNTER — OFFICE VISIT (OUTPATIENT)
Dept: GERIATRIC MEDICINE | Age: 89
End: 2025-01-10

## 2025-01-10 DIAGNOSIS — R53.1 WEAKNESS: ICD-10-CM

## 2025-01-10 DIAGNOSIS — I10 HYPERTENSION, UNSPECIFIED TYPE: ICD-10-CM

## 2025-01-10 DIAGNOSIS — F32.A DEPRESSION, UNSPECIFIED DEPRESSION TYPE: ICD-10-CM

## 2025-01-10 DIAGNOSIS — E03.9 HYPOTHYROIDISM, UNSPECIFIED TYPE: ICD-10-CM

## 2025-01-10 DIAGNOSIS — I48.91 ATRIAL FIBRILLATION, UNSPECIFIED TYPE (HCC): ICD-10-CM

## 2025-01-10 DIAGNOSIS — F03.B0 MODERATE DEMENTIA, UNSPECIFIED DEMENTIA TYPE, UNSPECIFIED WHETHER BEHAVIORAL, PSYCHOTIC, OR MOOD DISTURBANCE OR ANXIETY (HCC): Primary | ICD-10-CM

## 2025-01-10 DIAGNOSIS — E11.59 TYPE 2 DIABETES MELLITUS WITH OTHER CIRCULATORY COMPLICATION, WITHOUT LONG-TERM CURRENT USE OF INSULIN (HCC): ICD-10-CM

## 2025-01-11 NOTE — PROGRESS NOTES
SNF PROGRESS NOTE      Cc- Pneumonia       Patient is a Paige Dominguez 89 y.o. female who is being seen at [facility] after admit to the hospital for pneumonia and altered mental status. The patient has known lung cancer with radiation changes to the lungs. She was noted to have increased weakness     Patient Is sitting in the chair. She denies any pain and is eating well.         Past Medical History:   Diagnosis Date    Chronic kidney disease     Dementia (HCC)     Depression     Hypertension     Lung cancer (HCC)      Hydrochlorothiazide and Penicillins    VS reviewed      Gen- Alert and oriented x 2   Heart- RRR no murmur no LE edema   Lungs- CTA b/l no resp distress RA oxygen   Abd- bs x 4         Assessment and Plan      Weakness from recent Pneumonia  PT OT   Dementia   Remeron   namenda  HTN   Metoprolol   Losartan   Norvasc   Depression   celexa  DM  Glipizide   Metformin   CKD stage 3   Hypothyroid   Synthroid   A Fib   Lopressor   Eliquis     Estela Rae DO, FACOI     Electronically signed by: Estela Rae DO on 1/6/2025

## 2025-01-13 ENCOUNTER — OFFICE VISIT (OUTPATIENT)
Dept: GERIATRIC MEDICINE | Age: 89
End: 2025-01-13

## 2025-01-13 DIAGNOSIS — R52 PAIN: ICD-10-CM

## 2025-01-13 DIAGNOSIS — E03.9 HYPOTHYROIDISM, UNSPECIFIED TYPE: ICD-10-CM

## 2025-01-13 DIAGNOSIS — F03.B0 MODERATE DEMENTIA, UNSPECIFIED DEMENTIA TYPE, UNSPECIFIED WHETHER BEHAVIORAL, PSYCHOTIC, OR MOOD DISTURBANCE OR ANXIETY (HCC): Primary | ICD-10-CM

## 2025-01-13 DIAGNOSIS — R53.1 WEAKNESS: ICD-10-CM

## 2025-01-13 DIAGNOSIS — E11.59 TYPE 2 DIABETES MELLITUS WITH OTHER CIRCULATORY COMPLICATION, WITHOUT LONG-TERM CURRENT USE OF INSULIN (HCC): ICD-10-CM

## 2025-01-13 DIAGNOSIS — I10 HYPERTENSION, UNSPECIFIED TYPE: ICD-10-CM

## 2025-01-13 DIAGNOSIS — I48.91 ATRIAL FIBRILLATION, UNSPECIFIED TYPE (HCC): ICD-10-CM

## 2025-01-13 DIAGNOSIS — F32.A DEPRESSION, UNSPECIFIED DEPRESSION TYPE: ICD-10-CM

## 2025-01-13 RX ORDER — TRAMADOL HYDROCHLORIDE 50 MG/1
50 TABLET ORAL 2 TIMES DAILY
Qty: 14 TABLET | Refills: 0 | Status: SHIPPED | OUTPATIENT
Start: 2025-01-13 | End: 2025-01-20

## 2025-01-13 NOTE — PROGRESS NOTES
SNF PROGRESS NOTE      Cc- Pneumonia       Patient is a Paige Dominguez 89 y.o. female who is being seen at [facility] after admit to the hospital for pneumonia and altered mental status. The patient has known lung cancer with radiation changes to the lungs. She was noted to have increased weakness     Patient is sitting in her room. She is pleasant and eating well.         Past Medical History:   Diagnosis Date    Chronic kidney disease     Dementia (HCC)     Depression     Hypertension     Lung cancer (HCC)      Hydrochlorothiazide and Penicillins    VS reviewed    Gen- Alert and oriented x 2   Heart- RRR no murmur no LE edema   Lungs- CTA b/l no resp distress RA oxygen   Abd- bs x 4           Assessment and Plan    Weakness from recent Pneumonia  PT OT   Dementia   Remeron   namenda  HTN   Metoprolol   Losartan   Norvasc   Depression   celexa  DM  Glipizide   Metformin   CKD stage 3   Hypothyroid   Synthroid   A Fib   Lopressor   Eliquis       Estela Rae DO, FACHONG     Electronically signed by: Estela Rae DO on 1/7/2025

## 2025-01-13 NOTE — PROGRESS NOTES
seen for weakness after a hospital stay for pneumonia. The patient had a pretty unremarkable SNF course.           Discharge Diagnosis :    Weakness from recent Pneumonia  Dementia   HTN   Depression   DM  CKD stage 3   Hypothyroid   A Fib         Follow up --     PCP In 1-2 weeks.         REBECCA Ewing DO     Electronically signed by: Estela Rae DO on 1/13/2025

## 2025-01-15 NOTE — PROGRESS NOTES
SNF PROGRESS NOTE      Cc- Pneumonia       Patient is a Paige Dominguez 89 y.o. female who is being seen at [facility] after admit to the hospital for pneumonia and altered mental status. The patient has known lung cancer with radiation changes to the lungs. She was noted to have increased weakness     Patient is sitting in the chair and pleasant. No complaints.         Past Medical History:   Diagnosis Date    Chronic kidney disease     Dementia (HCC)     Depression     Hypertension     Lung cancer (HCC)      Hydrochlorothiazide and Penicillins    VS reviewed    Gen- Alert and oriented x 2   Heart- RRR no murmur no LE edema   Lungs- CTA b/l no resp distress RA oxygen   Abd- bs x 4           Assessment and Plan    Weakness from recent Pneumonia  PT OT   Dementia   Remeron   namenda  HTN   Metoprolol   Losartan   Norvasc   Depression   celexa  DM  Glipizide   Metformin   CKD stage 3   Hypothyroid   Synthroid   A Fib   Lopressor   Eliquis       Discharge planning on the 13th.     REBECCA Ewing DO     Electronically signed by: Estela Rae DO on 1/9/2025

## 2025-01-16 NOTE — PROGRESS NOTES
SNF PROGRESS NOTE      Cc- Pneumonia       Patient is a Paige Dominguez 89 y.o. female who is being seen at [facility] after admit to the hospital for pneumonia and altered mental status. The patient has known lung cancer with radiation changes to the lungs. She was noted to have increased weakness     Patient is sitting up in the chair. She denies any complaints at this time. No pain. Eating ok.         Past Medical History:   Diagnosis Date    Chronic kidney disease     Dementia (HCC)     Depression     Hypertension     Lung cancer (HCC)      Hydrochlorothiazide and Penicillins    VS reviewed      Gen- Alert and oriented x 2   Heart- RRR no murmur no LE edema   Lungs- CTA b/l no resp distress RA oxygen   Abd- bs x 4            Assessment and Plan    Weakness from recent Pneumonia  PT OT   Dementia   Remeron   namenda  HTN   Metoprolol   Losartan   Norvasc   Depression   celexa  DM  Glipizide   Metformin   CKD stage 3   Hypothyroid   Synthroid   A Fib   Lopressor   Eliquis         Discharge planning on the 13th.       REBECCA Ewing DO     Electronically signed by: Estela Rae DO on 1/10/2025

## 2025-01-28 NOTE — PROGRESS NOTES
SNF PROGRESS NOTE      Cc- Pneumonia       Patient is a Paige Dominguez 89 y.o. female who is being seen at [facility] after admit to the hospital for pneumonia and altered mental status. The patient has known lung cancer with radiation changes to the lungs. She was noted to have increased weakness     Patient Is sitting up on the side of the bed. She is eating well. She denies any complaints.          Past Medical History:   Diagnosis Date    Chronic kidney disease     Dementia (HCC)     Depression     Hypertension     Lung cancer (HCC)      Hydrochlorothiazide and Penicillins    VS reviewed    Gen- Alert and oriented x 2   Heart- RRR no murmur no LE edema   Lungs- CTA b/l no resp distress RA oxygen   Abd- bs x 4           Assessment and Plan    Weakness from recent Pneumonia  PT OT   Dementia   Remeron   namenda  HTN   Metoprolol   Losartan   Norvasc   Depression   celexa  DM  Glipizide   Metformin   CKD stage 3   Hypothyroid   Synthroid   A Fib   Lopressor   Eliquis       Estela Rae DO, REBECCA     Electronically signed by: Estela Rae DO on 1/3/2025

## 2025-03-14 ENCOUNTER — APPOINTMENT (OUTPATIENT)
Dept: GENERAL RADIOLOGY | Age: 89
End: 2025-03-14
Payer: MEDICARE

## 2025-03-14 ENCOUNTER — HOSPITAL ENCOUNTER (EMERGENCY)
Age: 89
Discharge: HOME | End: 2025-03-14
Attending: STUDENT IN AN ORGANIZED HEALTH CARE EDUCATION/TRAINING PROGRAM
Payer: MEDICARE

## 2025-03-14 VITALS
HEART RATE: 81 BPM | TEMPERATURE: 97.7 F | WEIGHT: 162 LBS | DIASTOLIC BLOOD PRESSURE: 68 MMHG | RESPIRATION RATE: 17 BRPM | SYSTOLIC BLOOD PRESSURE: 144 MMHG | BODY MASS INDEX: 26.96 KG/M2 | OXYGEN SATURATION: 100 %

## 2025-03-14 DIAGNOSIS — B34.9 VIRAL ILLNESS: ICD-10-CM

## 2025-03-14 DIAGNOSIS — I48.91 ATRIAL FIBRILLATION, UNSPECIFIED TYPE (HCC): ICD-10-CM

## 2025-03-14 DIAGNOSIS — J40 BRONCHITIS: Primary | ICD-10-CM

## 2025-03-14 LAB
ANION GAP SERPL CALCULATED.3IONS-SCNC: 7 MEQ/L (ref 9–15)
BASOPHILS # BLD: 0 K/UL (ref 0–0.2)
BASOPHILS NFR BLD: 0.4 %
BUN SERPL-MCNC: 8 MG/DL (ref 8–23)
CALCIUM SERPL-MCNC: 9.2 MG/DL (ref 8.5–9.9)
CHLORIDE SERPL-SCNC: 103 MEQ/L (ref 95–107)
CO2 SERPL-SCNC: 31 MEQ/L (ref 20–31)
CREAT SERPL-MCNC: 0.63 MG/DL (ref 0.5–0.9)
D DIMER PPP FEU-MCNC: 0.42 MG/L FEU (ref 0–0.5)
EKG ATRIAL RATE: 67 BPM
EKG Q-T INTERVAL: 382 MS
EKG QRS DURATION: 94 MS
EKG QTC CALCULATION (BAZETT): 409 MS
EKG R AXIS: -9 DEGREES
EKG T AXIS: 53 DEGREES
EKG VENTRICULAR RATE: 69 BPM
EOSINOPHIL # BLD: 0.2 K/UL (ref 0–0.7)
EOSINOPHIL NFR BLD: 3 %
ERYTHROCYTE [DISTWIDTH] IN BLOOD BY AUTOMATED COUNT: 14.5 % (ref 11.5–14.5)
GLUCOSE SERPL-MCNC: 192 MG/DL (ref 70–99)
HCT VFR BLD AUTO: 36.2 % (ref 37–47)
HGB BLD-MCNC: 11.1 G/DL (ref 12–16)
INFLUENZA A BY PCR: NEGATIVE
INFLUENZA B BY PCR: NEGATIVE
LYMPHOCYTES # BLD: 0.7 K/UL (ref 1–4.8)
LYMPHOCYTES NFR BLD: 9.2 %
MAGNESIUM SERPL-MCNC: 1.7 MG/DL (ref 1.7–2.4)
MCH RBC QN AUTO: 26.9 PG (ref 27–31.3)
MCHC RBC AUTO-ENTMCNC: 30.7 % (ref 33–37)
MCV RBC AUTO: 87.9 FL (ref 79.4–94.8)
MONOCYTES # BLD: 0.9 K/UL (ref 0.2–0.8)
MONOCYTES NFR BLD: 12.4 %
NEUTROPHILS # BLD: 5.2 K/UL (ref 1.4–6.5)
NEUTS SEG NFR BLD: 74.6 %
PLATELET # BLD AUTO: 314 K/UL (ref 130–400)
POTASSIUM SERPL-SCNC: 4.1 MEQ/L (ref 3.4–4.9)
RBC # BLD AUTO: 4.12 M/UL (ref 4.2–5.4)
SARS-COV-2 RDRP RESP QL NAA+PROBE: NOT DETECTED
SODIUM SERPL-SCNC: 141 MEQ/L (ref 135–144)
TROPONIN, HIGH SENSITIVITY: 16 NG/L (ref 0–19)
TROPONIN, HIGH SENSITIVITY: 18 NG/L (ref 0–19)
WBC # BLD AUTO: 7 K/UL (ref 4.8–10.8)

## 2025-03-14 PROCEDURE — 83735 ASSAY OF MAGNESIUM: CPT

## 2025-03-14 PROCEDURE — 94640 AIRWAY INHALATION TREATMENT: CPT

## 2025-03-14 PROCEDURE — 6360000002 HC RX W HCPCS: Performed by: STUDENT IN AN ORGANIZED HEALTH CARE EDUCATION/TRAINING PROGRAM

## 2025-03-14 PROCEDURE — 85379 FIBRIN DEGRADATION QUANT: CPT

## 2025-03-14 PROCEDURE — 71046 X-RAY EXAM CHEST 2 VIEWS: CPT

## 2025-03-14 PROCEDURE — 87502 INFLUENZA DNA AMP PROBE: CPT

## 2025-03-14 PROCEDURE — 84484 ASSAY OF TROPONIN QUANT: CPT

## 2025-03-14 PROCEDURE — 36415 COLL VENOUS BLD VENIPUNCTURE: CPT

## 2025-03-14 PROCEDURE — 94761 N-INVAS EAR/PLS OXIMETRY MLT: CPT

## 2025-03-14 PROCEDURE — 99285 EMERGENCY DEPT VISIT HI MDM: CPT

## 2025-03-14 PROCEDURE — 85025 COMPLETE CBC W/AUTO DIFF WBC: CPT

## 2025-03-14 PROCEDURE — 87635 SARS-COV-2 COVID-19 AMP PRB: CPT

## 2025-03-14 PROCEDURE — 93005 ELECTROCARDIOGRAM TRACING: CPT | Performed by: STUDENT IN AN ORGANIZED HEALTH CARE EDUCATION/TRAINING PROGRAM

## 2025-03-14 PROCEDURE — 80048 BASIC METABOLIC PNL TOTAL CA: CPT

## 2025-03-14 RX ORDER — ALBUTEROL SULFATE 0.83 MG/ML
2.5 SOLUTION RESPIRATORY (INHALATION)
Status: COMPLETED | OUTPATIENT
Start: 2025-03-14 | End: 2025-03-14

## 2025-03-14 RX ADMIN — ALBUTEROL SULFATE 2.5 MG: 2.5 SOLUTION RESPIRATORY (INHALATION) at 05:26

## 2025-03-14 ASSESSMENT — LIFESTYLE VARIABLES
HOW OFTEN DO YOU HAVE A DRINK CONTAINING ALCOHOL: NEVER
HOW MANY STANDARD DRINKS CONTAINING ALCOHOL DO YOU HAVE ON A TYPICAL DAY: PATIENT DOES NOT DRINK

## 2025-03-14 NOTE — DISCHARGE INSTRUCTIONS
You are seen in the ER today due to some coughing, runny nose and congestion and generalized weakness and fatigue.  Your chest x-ray does not show any obvious pneumonia.  EKG does not show any concerning findings for heart injury.  Your high-sensitivity troponins were slightly elevated although appear to be stable at this time.  Your blood clot test was normal.  White blood cell count is not elevated and you are not significantly anemic.  Your electrolytes and kidney function are also normal.  Your COVID and flu are negative.  Despite this, you could have still caught a viral illness from your son.  You have only been ill for 1 day which can falsely give a negative influenza swab test.  I will prescribe Mucinex for home for your cough.  I advise ibuprofen/or Tylenol alternating for any discomfort.    EKG showed that you have atrial fibrillation, you are currently on Eliquis 5 mg twice a day which we highly recommend you continue along with your aspirin.  He also on metoprolol 25 mg daily.  Please continue on this as well.

## 2025-03-14 NOTE — ED PROVIDER NOTES
(CELEXA) 20 MG tablet Take 1 tablet by mouth dailyHistorical Med      atorvastatin (LIPITOR) 80 MG tablet Take 1 tablet by mouth dailyHistorical Med      aspirin 81 MG EC tablet Take 1 tablet by mouth dailyHistorical Med      apixaban (ELIQUIS) 5 MG TABS tablet Take 1 tablet by mouth 2 times dailyHistorical Med      amLODIPine (NORVASC) 10 MG tablet Take 1 tablet by mouth dailyHistorical Med             ALLERGIES     Hydrochlorothiazide and Penicillins    FAMILY HISTORY     History reviewed. No pertinent family history.       SOCIAL HISTORY       Social History     Socioeconomic History    Marital status: Single     Spouse name: None    Number of children: None    Years of education: None    Highest education level: None   Tobacco Use    Smoking status: Never    Smokeless tobacco: Never   Vaping Use    Vaping status: Never Used   Substance and Sexual Activity    Alcohol use: Never    Drug use: Never     Social Drivers of Health     Financial Resource Strain: Patient Declined (6/5/2023)    [facility]    Overall Financial Resource Strain (CARDIA)     Difficulty of Paying Living Expenses: Patient declined   Food Insecurity: No Food Insecurity (11/21/2024)    Hunger Vital Sign     Worried About Running Out of Food in the Last Year: Never true     Ran Out of Food in the Last Year: Never true   Transportation Needs: No Transportation Needs (11/21/2024)    PRAPARE - Transportation     Lack of Transportation (Medical): No     Lack of Transportation (Non-Medical): No   Housing Stability: Low Risk  (11/21/2024)    Housing Stability Vital Sign     Unable to Pay for Housing in the Last Year: No     Number of Times Moved in the Last Year: 1     Homeless in the Last Year: No       SCREENINGS         [place] Coma Scale  Eye Opening: Spontaneous  Best Verbal Response: Oriented  Best Motor Response: Obeys commands  [place] Coma Scale Score: 15                     CIWA Assessment  BP: (!)  showed no leukocytosis or anemia that was significant from her baseline.  No MLIAGRO or electrolyte disturbance.  COVID and flu swabs are negative.  D-dimer was 0.42.  Given her reassuring chest x-ray with reassuring vitals and no hypoxia or tachypnea with a D-dimer, CT-PE testing was not performed.  Both of patient's high-sensitivity troponins were 18 and 16.  I gave patient a breathing treatment with albuterol which improved her shortness of breath and her coughing.  She apparently has a nebulizer machine at home with albuterol solutions although the daughter endorses that the patient does not like to take them as much because she wants a Ventimask to use.  Thus I did provide a Ventimask for patient to administer this at home which she was agreeable with.  Patient could have another viral illness causing her symptoms of runny nose and congestion and coughing, especially given that her family at home is had similar symptoms.  Her son did test positive for influenza.  I gave patient strict oral and written return precautions and she was stable upon discharging from the ED.      REASSESSMENT          CRITICAL CARE TIME   Total Critical Care time was 0 minutes, excluding separately reportable procedures.  There was a high probability of clinically significant/life threatening deterioration in the patient's condition which required my urgent intervention.     CONSULTS:  None    PROCEDURES:  Unless otherwise noted below, none     Procedures        FINAL IMPRESSION      1. Bronchitis    2. Viral illness    3. Atrial fibrillation, unspecified type (HCC)          DISPOSITION/PLAN   DISPOSITION Decision To Discharge 03/14/2025 07:46:19 AM      PATIENT REFERRED TO:  Mariia Mosley MD  [address] [facility] [address]  [phone]    Schedule an appointment as soon as possible for a visit on 3/14/2025        DISCHARGE MEDICATIONS:  Discharge Medication List as of 3/14/2025  6:40 AM        Controlled Substances Monitoring:

## 2025-03-14 NOTE — ED PROVIDER NOTES
Basic Information   Time Seen: 2:23 AM   Primary Care Provider: Mariia Mosley MD     Chief Complaint   Patient presents with    Shortness of Breath      HPI   Paige Dominguez is a 89 yrs female who presents with shortness of breath since yesterday.  Patient states that she has been coughing for the past few days.  She has had 3 episodes of diarrhea yesterday.  She denies chest pain currently.  She has a history of asthma.  She has tried nebulizers and albuterol inhaler with no relief.  She states that she has been around her other family members who have been sick with influenza.  She took Tylenol last at 5 yesterday.   Physical Exam     BP (!) 150/70 (03/14/25 0200)    Temp 97.7 °F (36.5 °C) (03/14/25 0200)    Pulse 75 (03/14/25 0200)   Resp 18 (03/14/25 0200)    SpO2 97 % (03/14/25 0200)       General: Awake and Alert, no acute distress   CV: RRR, S1, S2   Resp: LCTAB, even and non labored   Other:   Impression and Plan   Labs Reviewed - No data to display     No orders to display      Final Impression   I have performed a medical screening exam on Paige Dominguez. Based on this patient's chief complaint/symptoms of   Chief Complaint   Patient presents with    Shortness of Breath    and my focused exam, their care will be started and transitioned to provider when room is available       Alan Vivas PA-C  03/14/25 0224

## 2025-03-14 NOTE — ED TRIAGE NOTES
Started yesterday before bed states other family members are sick in household she has emesis, nausea, whole body aches, chest pain when she was coughing earlier but doesn't have any chest pain now, endorses SOB Hx asthma used nebulizer and rescue inhaler with no relief.

## 2025-03-15 ASSESSMENT — ENCOUNTER SYMPTOMS
ABDOMINAL PAIN: 0
EYE DISCHARGE: 0
COLOR CHANGE: 0
SHORTNESS OF BREATH: 1
RHINORRHEA: 1
PHOTOPHOBIA: 0
NAUSEA: 0
EYE REDNESS: 0
COUGH: 1
WHEEZING: 0
VOMITING: 0
EYE PAIN: 0
EYE ITCHING: 0
CHEST TIGHTNESS: 0